# Patient Record
Sex: MALE | Race: WHITE | NOT HISPANIC OR LATINO | URBAN - METROPOLITAN AREA
[De-identification: names, ages, dates, MRNs, and addresses within clinical notes are randomized per-mention and may not be internally consistent; named-entity substitution may affect disease eponyms.]

---

## 2018-02-20 ENCOUNTER — EMERGENCY (EMERGENCY)
Facility: HOSPITAL | Age: 78
LOS: 1 days | Discharge: HOME | End: 2018-02-20
Attending: EMERGENCY MEDICINE
Payer: MEDICARE

## 2018-02-20 VITALS
HEIGHT: 65 IN | WEIGHT: 160.06 LBS | TEMPERATURE: 98 F | SYSTOLIC BLOOD PRESSURE: 199 MMHG | DIASTOLIC BLOOD PRESSURE: 75 MMHG | OXYGEN SATURATION: 97 % | HEART RATE: 57 BPM | RESPIRATION RATE: 18 BRPM

## 2018-02-20 VITALS
DIASTOLIC BLOOD PRESSURE: 71 MMHG | SYSTOLIC BLOOD PRESSURE: 154 MMHG | OXYGEN SATURATION: 100 % | HEART RATE: 55 BPM | RESPIRATION RATE: 20 BRPM

## 2018-02-20 DIAGNOSIS — Z98.890 OTHER SPECIFIED POSTPROCEDURAL STATES: Chronic | ICD-10-CM

## 2018-02-20 LAB
ALBUMIN SERPL ELPH-MCNC: 3.6 G/DL — SIGNIFICANT CHANGE UP (ref 3.5–5)
ALP SERPL-CCNC: 86 U/L — SIGNIFICANT CHANGE UP (ref 40–120)
ALT FLD-CCNC: 22 U/L DA — SIGNIFICANT CHANGE UP (ref 10–60)
ANION GAP SERPL CALC-SCNC: 7 MMOL/L — SIGNIFICANT CHANGE UP (ref 5–17)
AST SERPL-CCNC: 19 U/L — SIGNIFICANT CHANGE UP (ref 10–40)
BASOPHILS # BLD AUTO: 0 K/UL — SIGNIFICANT CHANGE UP (ref 0–0.2)
BASOPHILS NFR BLD AUTO: 0.6 % — SIGNIFICANT CHANGE UP (ref 0–2)
BILIRUB SERPL-MCNC: 0.9 MG/DL — SIGNIFICANT CHANGE UP (ref 0.2–1.2)
BUN SERPL-MCNC: 16 MG/DL — SIGNIFICANT CHANGE UP (ref 7–18)
CALCIUM SERPL-MCNC: 9.2 MG/DL — SIGNIFICANT CHANGE UP (ref 8.4–10.5)
CHLORIDE SERPL-SCNC: 105 MMOL/L — SIGNIFICANT CHANGE UP (ref 96–108)
CO2 SERPL-SCNC: 27 MMOL/L — SIGNIFICANT CHANGE UP (ref 22–31)
CREAT SERPL-MCNC: 0.87 MG/DL — SIGNIFICANT CHANGE UP (ref 0.5–1.3)
EOSINOPHIL # BLD AUTO: 0.1 K/UL — SIGNIFICANT CHANGE UP (ref 0–0.5)
EOSINOPHIL NFR BLD AUTO: 2.5 % — SIGNIFICANT CHANGE UP (ref 0–6)
GLUCOSE SERPL-MCNC: 121 MG/DL — HIGH (ref 70–99)
HCT VFR BLD CALC: 45.7 % — SIGNIFICANT CHANGE UP (ref 39–50)
HGB BLD-MCNC: 14.9 G/DL — SIGNIFICANT CHANGE UP (ref 13–17)
LYMPHOCYTES # BLD AUTO: 0.7 K/UL — LOW (ref 1–3.3)
LYMPHOCYTES # BLD AUTO: 12.2 % — LOW (ref 13–44)
MCHC RBC-ENTMCNC: 30.7 PG — SIGNIFICANT CHANGE UP (ref 27–34)
MCHC RBC-ENTMCNC: 32.6 GM/DL — SIGNIFICANT CHANGE UP (ref 32–36)
MCV RBC AUTO: 94.2 FL — SIGNIFICANT CHANGE UP (ref 80–100)
MONOCYTES # BLD AUTO: 0.5 K/UL — SIGNIFICANT CHANGE UP (ref 0–0.9)
MONOCYTES NFR BLD AUTO: 8.1 % — SIGNIFICANT CHANGE UP (ref 2–14)
NEUTROPHILS # BLD AUTO: 4.6 K/UL — SIGNIFICANT CHANGE UP (ref 1.8–7.4)
NEUTROPHILS NFR BLD AUTO: 76.7 % — SIGNIFICANT CHANGE UP (ref 43–77)
PLATELET # BLD AUTO: 132 K/UL — LOW (ref 150–400)
POTASSIUM SERPL-MCNC: 3.9 MMOL/L — SIGNIFICANT CHANGE UP (ref 3.5–5.3)
POTASSIUM SERPL-SCNC: 3.9 MMOL/L — SIGNIFICANT CHANGE UP (ref 3.5–5.3)
PROT SERPL-MCNC: 7 G/DL — SIGNIFICANT CHANGE UP (ref 6–8.3)
RBC # BLD: 4.86 M/UL — SIGNIFICANT CHANGE UP (ref 4.2–5.8)
RBC # FLD: 12.2 % — SIGNIFICANT CHANGE UP (ref 10.3–14.5)
SODIUM SERPL-SCNC: 139 MMOL/L — SIGNIFICANT CHANGE UP (ref 135–145)
TROPONIN I SERPL-MCNC: <0.015 NG/ML — SIGNIFICANT CHANGE UP (ref 0–0.04)
WBC # BLD: 5.9 K/UL — SIGNIFICANT CHANGE UP (ref 3.8–10.5)
WBC # FLD AUTO: 5.9 K/UL — SIGNIFICANT CHANGE UP (ref 3.8–10.5)

## 2018-02-20 PROCEDURE — 99284 EMERGENCY DEPT VISIT MOD MDM: CPT | Mod: 25

## 2018-02-20 PROCEDURE — 99284 EMERGENCY DEPT VISIT MOD MDM: CPT

## 2018-02-20 PROCEDURE — 85027 COMPLETE CBC AUTOMATED: CPT

## 2018-02-20 PROCEDURE — 80053 COMPREHEN METABOLIC PANEL: CPT

## 2018-02-20 PROCEDURE — 93005 ELECTROCARDIOGRAM TRACING: CPT

## 2018-02-20 PROCEDURE — 84484 ASSAY OF TROPONIN QUANT: CPT

## 2018-02-20 RX ORDER — METOPROLOL TARTRATE 50 MG
25 TABLET ORAL ONCE
Qty: 0 | Refills: 0 | Status: COMPLETED | OUTPATIENT
Start: 2018-02-20 | End: 2018-02-20

## 2018-02-20 RX ORDER — ISOSORBIDE MONONITRATE 60 MG/1
30 TABLET, EXTENDED RELEASE ORAL ONCE
Qty: 0 | Refills: 0 | Status: DISCONTINUED | OUTPATIENT
Start: 2018-02-20 | End: 2018-02-20

## 2018-02-20 NOTE — ED PROVIDER NOTE - MEDICAL DECISION MAKING DETAILS
Dizziness has resolved and at presentation had no signs of central ischemia, no other neurologic symptoms. CP has been chronic, followed by cardiologist, no prior ECG to compare with no RAUDEL noted today and nml trop, and no longer present. Patient well appearing, hemodynamically stable, BP improved spontaneously, given his missed morning metoprolol. Dizziness has resolved and at presentation had no signs of central ischemia, no other neurologic symptoms. CP has been chronic, followed by cardiologist, no prior ECG to compare with no RAUDEL noted today and nml trop, and no longer present. Patient well appearing, hemodynamically stable, BP improved spontaneously.

## 2018-02-20 NOTE — ED PROVIDER NOTE - PMH
CAD (coronary artery disease)    HLD (hyperlipidemia)    HTN (hypertension)    Stented coronary artery

## 2018-02-20 NOTE — ED PROVIDER NOTE - PHYSICAL EXAMINATION
Afebrile, hemodynamically stable  NAD, well appearing  Head NCAT  EOMI, anicteric, noted L eye ptosis chronic, mild fatiguing R eye nystagmus  MM dry  No JVD  RRR, nml S1/S2, no m/r/g  Lungs CTAB, no w/r/r  Abd soft, NT, ND, nml BS, no rebound or guarding  AAO, CN's 3-12 intact, motor 5/5 in all extrems  BLACK spontaneously, no leg cyanosis or edema  Skin warm, dry

## 2018-10-12 ENCOUNTER — APPOINTMENT (OUTPATIENT)
Dept: OTOLARYNGOLOGY | Facility: CLINIC | Age: 78
End: 2018-10-12
Payer: MEDICARE

## 2018-10-12 VITALS
DIASTOLIC BLOOD PRESSURE: 76 MMHG | HEART RATE: 68 BPM | SYSTOLIC BLOOD PRESSURE: 153 MMHG | TEMPERATURE: 98.5 F | OXYGEN SATURATION: 97 %

## 2018-10-12 PROCEDURE — 69210 REMOVE IMPACTED EAR WAX UNI: CPT

## 2018-10-12 PROCEDURE — 99203 OFFICE O/P NEW LOW 30 MIN: CPT | Mod: 25

## 2019-04-22 ENCOUNTER — APPOINTMENT (OUTPATIENT)
Dept: OTOLARYNGOLOGY | Facility: CLINIC | Age: 79
End: 2019-04-22
Payer: MEDICARE

## 2019-04-22 VITALS
HEART RATE: 61 BPM | DIASTOLIC BLOOD PRESSURE: 74 MMHG | TEMPERATURE: 97.4 F | SYSTOLIC BLOOD PRESSURE: 142 MMHG | OXYGEN SATURATION: 65 %

## 2019-04-22 DIAGNOSIS — H61.22 IMPACTED CERUMEN, LEFT EAR: ICD-10-CM

## 2019-04-22 DIAGNOSIS — H60.92 UNSPECIFIED OTITIS EXTERNA, LEFT EAR: ICD-10-CM

## 2019-04-22 PROCEDURE — 99213 OFFICE O/P EST LOW 20 MIN: CPT | Mod: 25

## 2019-04-22 PROCEDURE — 69210 REMOVE IMPACTED EAR WAX UNI: CPT | Mod: LT

## 2019-04-22 RX ORDER — NEOMYCIN SULFATE, POLYMYXIN B SULFATE AND HYDROCORTISONE 3.5; 10000; 1 MG/ML; [IU]/ML; MG/ML
3.5-10000-1 SOLUTION AURICULAR (OTIC) 4 TIMES DAILY
Qty: 1 | Refills: 4 | Status: ACTIVE | COMMUNITY
Start: 2019-04-22 | End: 1900-01-01

## 2019-04-22 NOTE — PROCEDURE
[] : Removal of Cerumen [FreeTextEntry5] : Bilateral cerumen impaction.  Cerumen impaction was removed via otoscope operating head and craven suction # 5

## 2019-04-22 NOTE — PHYSICAL EXAM
[de-identified] :  Alfonso AU removed [Midline] : trachea located in midline position [Normal] : no rashes

## 2019-04-22 NOTE — REASON FOR VISIT
[Subsequent Evaluation] : a subsequent evaluation for [FreeTextEntry2] : status post functional endoscopic sinus surgery & cerumen impaction.  Patient states the severity of the problem is a level  4 out of 10 and it occurs constant .  Patient states nothing improves or worsens his cerumen impaction

## 2019-04-22 NOTE — HISTORY OF PRESENT ILLNESS
[de-identified] : 74 years old status post functional endoscopic sinus surgery  male patient with history of cerumen impaction.  Patient is present today in the office with left ear  cerumen impaction.  Left ear Otitis externa

## 2019-04-22 NOTE — REVIEW OF SYSTEMS
[Patient Intake Form Reviewed] : Patient intake form was reviewed [As Noted in HPI] : as noted in HPI [Ear Pain] : ear pain [Hearing Loss] : hearing loss [Negative] : Heme/Lymph

## 2024-11-19 NOTE — ED ADULT TRIAGE NOTE - MEANS OF ARRIVAL
2nd attempt-Left message for patient to call back at [phone] to schedule below procedure.     Mailed letter for patient to contact office to schedule procedure.   
I called patient and left message to call back to schedule colonoscopy.  First attempt.    Referring Physician: Dr Crum    Procedure: Colonoscopy [id number]    Diagnosis:   COLONOSCOPY:  Screening for Colon Cancer  EGD:  N/A    DUE: mm/dd/year:  12.18.23 (N/A if never scoped)    BMI over 50: No  There is no height or weight on file to calculate BMI.     Recent (within 6 months) Myocardial Infarction or Stroke: No    Cardiac assistive device (VAD's not including pacemakers or automatic defibrillators):  No    (If yes to any of the above 3 questions, location must be hospital)    Location: [facility]    GI Physician: Any GI Physician  (Select if already established with GI, otherwise indicate Any GI Physician)    Diabetic: NO     Blood Thinners: Yes meloxicam    PHENTERMINE: NO      Pharmacy: (obtained at time of scheduling)          
stretcher

## 2025-01-09 ENCOUNTER — INPATIENT (INPATIENT)
Facility: HOSPITAL | Age: 85
LOS: 4 days | Discharge: HOME | DRG: 125 | End: 2025-01-14
Attending: STUDENT IN AN ORGANIZED HEALTH CARE EDUCATION/TRAINING PROGRAM | Admitting: STUDENT IN AN ORGANIZED HEALTH CARE EDUCATION/TRAINING PROGRAM
Payer: MEDICARE

## 2025-01-09 VITALS
WEIGHT: 149.91 LBS | HEART RATE: 64 BPM | RESPIRATION RATE: 18 BRPM | TEMPERATURE: 98 F | DIASTOLIC BLOOD PRESSURE: 73 MMHG | HEIGHT: 66 IN | OXYGEN SATURATION: 100 % | SYSTOLIC BLOOD PRESSURE: 149 MMHG

## 2025-01-09 DIAGNOSIS — Z98.890 OTHER SPECIFIED POSTPROCEDURAL STATES: Chronic | ICD-10-CM

## 2025-01-09 DIAGNOSIS — H54.7 UNSPECIFIED VISUAL LOSS: ICD-10-CM

## 2025-01-09 LAB
ALBUMIN SERPL ELPH-MCNC: 3.7 G/DL — SIGNIFICANT CHANGE UP (ref 3.5–5)
ALP SERPL-CCNC: 83 U/L — SIGNIFICANT CHANGE UP (ref 40–120)
ALT FLD-CCNC: 20 U/L DA — SIGNIFICANT CHANGE UP (ref 10–60)
ANION GAP SERPL CALC-SCNC: 5 MMOL/L — SIGNIFICANT CHANGE UP (ref 5–17)
APTT BLD: 29.8 SEC — SIGNIFICANT CHANGE UP (ref 24.5–35.6)
AST SERPL-CCNC: 19 U/L — SIGNIFICANT CHANGE UP (ref 10–40)
BASE EXCESS BLDV CALC-SCNC: 4.9 MMOL/L — SIGNIFICANT CHANGE UP
BASOPHILS # BLD AUTO: 0.02 K/UL — SIGNIFICANT CHANGE UP (ref 0–0.2)
BASOPHILS NFR BLD AUTO: 0.3 % — SIGNIFICANT CHANGE UP (ref 0–2)
BILIRUB SERPL-MCNC: 0.7 MG/DL — SIGNIFICANT CHANGE UP (ref 0.2–1.2)
BUN SERPL-MCNC: 19 MG/DL — HIGH (ref 7–18)
CALCIUM SERPL-MCNC: 9.6 MG/DL — SIGNIFICANT CHANGE UP (ref 8.4–10.5)
CHLORIDE SERPL-SCNC: 108 MMOL/L — SIGNIFICANT CHANGE UP (ref 96–108)
CO2 SERPL-SCNC: 25 MMOL/L — SIGNIFICANT CHANGE UP (ref 22–31)
CREAT SERPL-MCNC: 0.87 MG/DL — SIGNIFICANT CHANGE UP (ref 0.5–1.3)
EGFR: 84 ML/MIN/1.73M2 — SIGNIFICANT CHANGE UP
EOSINOPHIL # BLD AUTO: 0.15 K/UL — SIGNIFICANT CHANGE UP (ref 0–0.5)
EOSINOPHIL NFR BLD AUTO: 2.6 % — SIGNIFICANT CHANGE UP (ref 0–6)
ETHANOL SERPL-MCNC: <3 MG/DL — SIGNIFICANT CHANGE UP (ref 0–10)
GLUCOSE SERPL-MCNC: 127 MG/DL — HIGH (ref 70–99)
HCO3 BLDV-SCNC: 29 MMOL/L — SIGNIFICANT CHANGE UP (ref 22–29)
HCT VFR BLD CALC: 39.4 % — SIGNIFICANT CHANGE UP (ref 39–50)
HGB BLD-MCNC: 13.9 G/DL — SIGNIFICANT CHANGE UP (ref 13–17)
IMM GRANULOCYTES NFR BLD AUTO: 0.2 % — SIGNIFICANT CHANGE UP (ref 0–0.9)
INR BLD: 0.98 RATIO — SIGNIFICANT CHANGE UP (ref 0.85–1.16)
LYMPHOCYTES # BLD AUTO: 1.2 K/UL — SIGNIFICANT CHANGE UP (ref 1–3.3)
LYMPHOCYTES # BLD AUTO: 20.9 % — SIGNIFICANT CHANGE UP (ref 13–44)
MCHC RBC-ENTMCNC: 32.4 PG — SIGNIFICANT CHANGE UP (ref 27–34)
MCHC RBC-ENTMCNC: 35.3 G/DL — SIGNIFICANT CHANGE UP (ref 32–36)
MCV RBC AUTO: 91.8 FL — SIGNIFICANT CHANGE UP (ref 80–100)
MONOCYTES # BLD AUTO: 0.72 K/UL — SIGNIFICANT CHANGE UP (ref 0–0.9)
MONOCYTES NFR BLD AUTO: 12.6 % — SIGNIFICANT CHANGE UP (ref 2–14)
NEUTROPHILS # BLD AUTO: 3.63 K/UL — SIGNIFICANT CHANGE UP (ref 1.8–7.4)
NEUTROPHILS NFR BLD AUTO: 63.4 % — SIGNIFICANT CHANGE UP (ref 43–77)
NRBC # BLD: 0 /100 WBCS — SIGNIFICANT CHANGE UP (ref 0–0)
PCO2 BLDV: 41 MMHG — LOW (ref 42–55)
PH BLDV: 7.46 — HIGH (ref 7.32–7.43)
PLATELET # BLD AUTO: 160 K/UL — SIGNIFICANT CHANGE UP (ref 150–400)
PO2 BLDV: 41 MMHG — SIGNIFICANT CHANGE UP
POTASSIUM SERPL-MCNC: 4.2 MMOL/L — SIGNIFICANT CHANGE UP (ref 3.5–5.3)
POTASSIUM SERPL-SCNC: 4.2 MMOL/L — SIGNIFICANT CHANGE UP (ref 3.5–5.3)
PROT SERPL-MCNC: 6.8 G/DL — SIGNIFICANT CHANGE UP (ref 6–8.3)
PROTHROM AB SERPL-ACNC: 11.4 SEC — SIGNIFICANT CHANGE UP (ref 9.9–13.4)
RBC # BLD: 4.29 M/UL — SIGNIFICANT CHANGE UP (ref 4.2–5.8)
RBC # FLD: 12.6 % — SIGNIFICANT CHANGE UP (ref 10.3–14.5)
SAO2 % BLDV: 58.1 % — SIGNIFICANT CHANGE UP
SODIUM SERPL-SCNC: 138 MMOL/L — SIGNIFICANT CHANGE UP (ref 135–145)
TROPONIN I, HIGH SENSITIVITY RESULT: 17.8 NG/L — SIGNIFICANT CHANGE UP
WBC # BLD: 5.73 K/UL — SIGNIFICANT CHANGE UP (ref 3.8–10.5)
WBC # FLD AUTO: 5.73 K/UL — SIGNIFICANT CHANGE UP (ref 3.8–10.5)

## 2025-01-09 PROCEDURE — 70496 CT ANGIOGRAPHY HEAD: CPT | Mod: 26

## 2025-01-09 PROCEDURE — 70498 CT ANGIOGRAPHY NECK: CPT | Mod: 26

## 2025-01-09 PROCEDURE — 70450 CT HEAD/BRAIN W/O DYE: CPT | Mod: 26,XU

## 2025-01-09 PROCEDURE — 99285 EMERGENCY DEPT VISIT HI MDM: CPT

## 2025-01-09 PROCEDURE — 93010 ELECTROCARDIOGRAM REPORT: CPT

## 2025-01-09 RX ORDER — LATANOPROST 50 UG/ML
1 SOLUTION OPHTHALMIC
Refills: 0 | DISCHARGE

## 2025-01-09 RX ORDER — METOPROLOL TARTRATE 50 MG
1 TABLET ORAL
Refills: 0 | DISCHARGE

## 2025-01-09 RX ORDER — GINKGO BILOBA 40 MG
3 CAPSULE ORAL AT BEDTIME
Refills: 0 | Status: DISCONTINUED | OUTPATIENT
Start: 2025-01-09 | End: 2025-01-14

## 2025-01-09 RX ORDER — ASPIRIN 81 MG
1 TABLET, DELAYED RELEASE (ENTERIC COATED) ORAL
Refills: 0 | DISCHARGE

## 2025-01-09 RX ORDER — RAMIPRIL 5 MG/1
1 CAPSULE ORAL
Refills: 0 | DISCHARGE

## 2025-01-09 RX ORDER — TIMOLOL MALEATE 0.5 %
1 DROPS OPHTHALMIC (EYE)
Refills: 0 | DISCHARGE

## 2025-01-09 RX ORDER — ISOSORBIDE DINITRATE 10 MG
1 TABLET ORAL
Refills: 0 | DISCHARGE

## 2025-01-09 RX ORDER — ACETAMINOPHEN 80 MG/.8ML
650 SOLUTION/ DROPS ORAL EVERY 6 HOURS
Refills: 0 | Status: DISCONTINUED | OUTPATIENT
Start: 2025-01-09 | End: 2025-01-14

## 2025-01-09 RX ORDER — ROSUVASTATIN 40 MG/1
1 TABLET, FILM COATED ORAL
Refills: 0 | DISCHARGE

## 2025-01-09 NOTE — ED ADULT TRIAGE NOTE - CHIEF COMPLAINT QUOTE
Right eye sudden vision loss x 3 days ago that lasted 1 hr. Sent by eye doctor today to r/o TIA. No neuro deficit noted.

## 2025-01-09 NOTE — ED PROVIDER NOTE - CLINICAL SUMMARY MEDICAL DECISION MAKING FREE TEXT BOX
Presentation concerning for possible TIA.  Although the episode was 2 days ago patient only has 1 functional eye and is at the extreme of age.  Patient to be admitted for full TIA workup.

## 2025-01-09 NOTE — ED PROVIDER NOTE - OBJECTIVE STATEMENT
84-year-old male with left eye blindness presents reporting episode of right eye vision loss that occurred 3 days ago.  Patient reports upon waking having a period of inability to see upon waking up.  Reports the following day he saw his primary care doctor who then scheduled him to see ophthalmologist.  Reports yesterday being seen by ophthalmology and being told today to come to the ED out of concern for possible CVA versus TIA.  Patient reports his vision has returned and has no complaints at the bedside.

## 2025-01-09 NOTE — ED ADULT NURSE NOTE - OBJECTIVE STATEMENT
Patient came in ED c/o right eye vision loss for 3 days, patient was seen by eye doctor and was referred to ED to R/o TIA. Patient AoX3, steady, no neuro deficit noted.

## 2025-01-10 ENCOUNTER — RESULT REVIEW (OUTPATIENT)
Age: 85
End: 2025-01-10

## 2025-01-10 DIAGNOSIS — I10 ESSENTIAL (PRIMARY) HYPERTENSION: ICD-10-CM

## 2025-01-10 DIAGNOSIS — H54.7 UNSPECIFIED VISUAL LOSS: ICD-10-CM

## 2025-01-10 DIAGNOSIS — I63.9 CEREBRAL INFARCTION, UNSPECIFIED: ICD-10-CM

## 2025-01-10 DIAGNOSIS — I25.10 ATHEROSCLEROTIC HEART DISEASE OF NATIVE CORONARY ARTERY WITHOUT ANGINA PECTORIS: ICD-10-CM

## 2025-01-10 DIAGNOSIS — E78.5 HYPERLIPIDEMIA, UNSPECIFIED: ICD-10-CM

## 2025-01-10 DIAGNOSIS — Z29.9 ENCOUNTER FOR PROPHYLACTIC MEASURES, UNSPECIFIED: ICD-10-CM

## 2025-01-10 LAB
A1C WITH ESTIMATED AVERAGE GLUCOSE RESULT: 6 % — HIGH (ref 4–5.6)
ALBUMIN SERPL ELPH-MCNC: 3.8 G/DL — SIGNIFICANT CHANGE UP (ref 3.5–5)
ALP SERPL-CCNC: 93 U/L — SIGNIFICANT CHANGE UP (ref 40–120)
ALT FLD-CCNC: 21 U/L DA — SIGNIFICANT CHANGE UP (ref 10–60)
ANION GAP SERPL CALC-SCNC: 7 MMOL/L — SIGNIFICANT CHANGE UP (ref 5–17)
AST SERPL-CCNC: 20 U/L — SIGNIFICANT CHANGE UP (ref 10–40)
BILIRUB SERPL-MCNC: 1 MG/DL — SIGNIFICANT CHANGE UP (ref 0.2–1.2)
BUN SERPL-MCNC: 19 MG/DL — HIGH (ref 7–18)
CALCIUM SERPL-MCNC: 9.7 MG/DL — SIGNIFICANT CHANGE UP (ref 8.4–10.5)
CHLORIDE SERPL-SCNC: 110 MMOL/L — HIGH (ref 96–108)
CHOLEST SERPL-MCNC: 124 MG/DL — SIGNIFICANT CHANGE UP
CO2 SERPL-SCNC: 25 MMOL/L — SIGNIFICANT CHANGE UP (ref 22–31)
CREAT SERPL-MCNC: 0.93 MG/DL — SIGNIFICANT CHANGE UP (ref 0.5–1.3)
EGFR: 80 ML/MIN/1.73M2 — SIGNIFICANT CHANGE UP
ESTIMATED AVERAGE GLUCOSE: 126 MG/DL — HIGH (ref 68–114)
GLUCOSE SERPL-MCNC: 119 MG/DL — HIGH (ref 70–99)
HCT VFR BLD CALC: 41.1 % — SIGNIFICANT CHANGE UP (ref 39–50)
HDLC SERPL-MCNC: 44 MG/DL — SIGNIFICANT CHANGE UP
HGB BLD-MCNC: 14.3 G/DL — SIGNIFICANT CHANGE UP (ref 13–17)
LIPID PNL WITH DIRECT LDL SERPL: 60 MG/DL — SIGNIFICANT CHANGE UP
MAGNESIUM SERPL-MCNC: 2.4 MG/DL — SIGNIFICANT CHANGE UP (ref 1.6–2.6)
MCHC RBC-ENTMCNC: 32 PG — SIGNIFICANT CHANGE UP (ref 27–34)
MCHC RBC-ENTMCNC: 34.8 G/DL — SIGNIFICANT CHANGE UP (ref 32–36)
MCV RBC AUTO: 91.9 FL — SIGNIFICANT CHANGE UP (ref 80–100)
NON HDL CHOLESTEROL: 80 MG/DL — SIGNIFICANT CHANGE UP
NRBC # BLD: 0 /100 WBCS — SIGNIFICANT CHANGE UP (ref 0–0)
PHOSPHATE SERPL-MCNC: 3.5 MG/DL — SIGNIFICANT CHANGE UP (ref 2.5–4.5)
PLATELET # BLD AUTO: 164 K/UL — SIGNIFICANT CHANGE UP (ref 150–400)
POTASSIUM SERPL-MCNC: 4.1 MMOL/L — SIGNIFICANT CHANGE UP (ref 3.5–5.3)
POTASSIUM SERPL-SCNC: 4.1 MMOL/L — SIGNIFICANT CHANGE UP (ref 3.5–5.3)
PROT SERPL-MCNC: 7 G/DL — SIGNIFICANT CHANGE UP (ref 6–8.3)
RBC # BLD: 4.47 M/UL — SIGNIFICANT CHANGE UP (ref 4.2–5.8)
RBC # FLD: 12.6 % — SIGNIFICANT CHANGE UP (ref 10.3–14.5)
SODIUM SERPL-SCNC: 142 MMOL/L — SIGNIFICANT CHANGE UP (ref 135–145)
TRIGL SERPL-MCNC: 109 MG/DL — SIGNIFICANT CHANGE UP
WBC # BLD: 5.62 K/UL — SIGNIFICANT CHANGE UP (ref 3.8–10.5)
WBC # FLD AUTO: 5.62 K/UL — SIGNIFICANT CHANGE UP (ref 3.8–10.5)

## 2025-01-10 PROCEDURE — 99222 1ST HOSP IP/OBS MODERATE 55: CPT

## 2025-01-10 PROCEDURE — 99223 1ST HOSP IP/OBS HIGH 75: CPT

## 2025-01-10 PROCEDURE — 93306 TTE W/DOPPLER COMPLETE: CPT | Mod: 26

## 2025-01-10 RX ORDER — METOPROLOL TARTRATE 50 MG
25 TABLET ORAL DAILY
Refills: 0 | Status: DISCONTINUED | OUTPATIENT
Start: 2025-01-10 | End: 2025-01-14

## 2025-01-10 RX ORDER — TIMOLOL MALEATE 0.5 %
1 DROPS OPHTHALMIC (EYE) DAILY
Refills: 0 | Status: DISCONTINUED | OUTPATIENT
Start: 2025-01-10 | End: 2025-01-14

## 2025-01-10 RX ORDER — LATANOPROST 50 UG/ML
1 SOLUTION OPHTHALMIC AT BEDTIME
Refills: 0 | Status: DISCONTINUED | OUTPATIENT
Start: 2025-01-10 | End: 2025-01-14

## 2025-01-10 RX ORDER — ASPIRIN 81 MG
81 TABLET, DELAYED RELEASE (ENTERIC COATED) ORAL DAILY
Refills: 0 | Status: DISCONTINUED | OUTPATIENT
Start: 2025-01-10 | End: 2025-01-14

## 2025-01-10 RX ORDER — ENOXAPARIN SODIUM 60 MG/.6ML
40 INJECTION INTRAVENOUS; SUBCUTANEOUS EVERY 24 HOURS
Refills: 0 | Status: DISCONTINUED | OUTPATIENT
Start: 2025-01-10 | End: 2025-01-14

## 2025-01-10 RX ORDER — ISOSORBIDE DINITRATE 10 MG
20 TABLET ORAL THREE TIMES A DAY
Refills: 0 | Status: DISCONTINUED | OUTPATIENT
Start: 2025-01-10 | End: 2025-01-14

## 2025-01-10 RX ORDER — ATORVASTATIN CALCIUM 40 MG/1
80 TABLET, FILM COATED ORAL AT BEDTIME
Refills: 0 | Status: DISCONTINUED | OUTPATIENT
Start: 2025-01-10 | End: 2025-01-14

## 2025-01-10 RX ORDER — CLOPIDOGREL BISULFATE 75 MG/1
75 TABLET, FILM COATED ORAL DAILY
Refills: 0 | Status: DISCONTINUED | OUTPATIENT
Start: 2025-01-10 | End: 2025-01-14

## 2025-01-10 RX ORDER — LISINOPRIL 30 MG/1
40 TABLET ORAL DAILY
Refills: 0 | Status: DISCONTINUED | OUTPATIENT
Start: 2025-01-10 | End: 2025-01-14

## 2025-01-10 RX ORDER — ALPRAZOLAM 0.25 MG/1
0.25 TABLET ORAL
Refills: 0 | Status: DISCONTINUED | OUTPATIENT
Start: 2025-01-10 | End: 2025-01-12

## 2025-01-10 RX ADMIN — Medication 3 MILLIGRAM(S): at 22:17

## 2025-01-10 RX ADMIN — LATANOPROST 1 DROP(S): 50 SOLUTION OPHTHALMIC at 22:24

## 2025-01-10 RX ADMIN — ENOXAPARIN SODIUM 40 MILLIGRAM(S): 60 INJECTION INTRAVENOUS; SUBCUTANEOUS at 07:48

## 2025-01-10 RX ADMIN — Medication 25 MILLIGRAM(S): at 07:48

## 2025-01-10 RX ADMIN — ALPRAZOLAM 0.25 MILLIGRAM(S): 0.25 TABLET ORAL at 22:17

## 2025-01-10 RX ADMIN — Medication 81 MILLIGRAM(S): at 12:23

## 2025-01-10 RX ADMIN — ATORVASTATIN CALCIUM 80 MILLIGRAM(S): 40 TABLET, FILM COATED ORAL at 22:17

## 2025-01-10 RX ADMIN — CLOPIDOGREL BISULFATE 75 MILLIGRAM(S): 75 TABLET, FILM COATED ORAL at 12:49

## 2025-01-10 RX ADMIN — Medication 1 DROP(S): at 17:22

## 2025-01-10 RX ADMIN — Medication 20 MILLIGRAM(S): at 22:18

## 2025-01-10 RX ADMIN — Medication 1 DROP(S): at 07:48

## 2025-01-10 RX ADMIN — LISINOPRIL 40 MILLIGRAM(S): 30 TABLET ORAL at 07:48

## 2025-01-10 RX ADMIN — Medication 1 DROP(S): at 22:18

## 2025-01-10 RX ADMIN — Medication 20 MILLIGRAM(S): at 12:23

## 2025-01-10 RX ADMIN — Medication 10 MILLIGRAM(S): at 07:48

## 2025-01-10 RX ADMIN — Medication 20 MILLIGRAM(S): at 07:48

## 2025-01-10 NOTE — PATIENT PROFILE ADULT - FALL HARM RISK - HARM RISK INTERVENTIONS

## 2025-01-10 NOTE — CONSULT NOTE ADULT - SUBJECTIVE AND OBJECTIVE BOX
NEUROLOGY CONSULT NOTE    NAME:  ERMELINDA ALY      ASSESSMENT:        RECOMMENDATIONS:        NOTE TO BE COMPLETED - PLEASE REFER TO ABOVE ONLY AND IGNORE INFORMATION BELOW    *******************************      CHIEF COMPLAINT:  Patient is a 84y old  Male who presents with a chief complaint of CVA (10 Sylvester 2025 10:03)      HPI:  84-year-old male, AAOx3, ambulates with a  walker with left eye blindness, HTN, cad s/p CABG , hld, presents reporting episode of right eye vision loss that occurred 3 days ago.  Patient reports upon waking having a period of inability to see upon waking up in his right eye for about 15 minutes on and off.  Reports the following day he saw his primary care doctor who then scheduled him to see ophthalmologist.  Reports yesterday being seen by ophthalmology and being told today to come to the ED out of concern for possible CVA versus TIA.   His vision now has normalized. He reports this has never happened to him before. Denies any fever, chills, n/v, cp, sob, abdominal pain, n/v, or diarrhea.     ED course:  Vitals: temp 98, HR 64, /73  cth/angio/neck: neg  (10 Sylvester 2025 00:43)      NEURO HPI:      PAST MEDICAL & SURGICAL HISTORY:  HTN (hypertension)      CAD (coronary artery disease)      HLD (hyperlipidemia)      Stented coronary artery      History of cholecystectomy          MEDICATIONS:  acetaminophen     Tablet .. 650 milliGRAM(s) Oral every 6 hours PRN  ALPRAZolam 0.25 milliGRAM(s) Oral two times a day PRN  amLODIPine   Tablet 10 milliGRAM(s) Oral daily  aspirin enteric coated 81 milliGRAM(s) Oral daily  atorvastatin 80 milliGRAM(s) Oral at bedtime  clopidogrel Tablet 75 milliGRAM(s) Oral daily  dorzolamide 2% Ophthalmic Solution 1 Drop(s) Both EYES three times a day  enoxaparin Injectable 40 milliGRAM(s) SubCutaneous every 24 hours  isosorbide   dinitrate Tablet (ISORDIL) 20 milliGRAM(s) Oral three times a day  latanoprost 0.005% Ophthalmic Solution 1 Drop(s) Both EYES at bedtime  lisinopril 40 milliGRAM(s) Oral daily  melatonin 3 milliGRAM(s) Oral at bedtime PRN  metoprolol tartrate 25 milliGRAM(s) Oral daily  timolol 0.5% Solution 1 Drop(s) Both EYES daily      ALLERGIES:  No Known Allergies      FAMILY HISTORY:        SOCIAL HISTORY:  Denies alcohol, tobacco, or illicit drug use      REVIEW OF SYSTEMS:  GENERAL: No fever, weight changes, fatigue  EYES: No eye pain or discharge  EAR/NOSE/MOUTH/THROAT: No sinus or throat pain; No difficulty hearing  NECK: No pain or stiffness  RESPIRATORY: No cough, wheezing, chills, or hemoptysis  CARDIOVASCULAR: No chest pain, palpitations, shortness of breath, or dyspnea on exertion  GASTROINTESTINAL: No abdominal pain, nausea, vomiting, hematemesis, diarrhea, or constipation  GENITOURINARY: No dysuria, frequency, hematuria, or incontinence  SKIN: No rashes or lesions  ENDOCRINE: No heat or cold intolerance  HEMATOLOGIC: No easy bruising or bleeding  PSYCHIATRIC: No depression, anxiety, or mood swings  MUSCULOSKELETAL: No joint pain or swelling  NEUROLOGICAL: As per HPI          OBJECTIVE:    Vital Signs Last 24 Hrs  T(C): 37.2 (10 Sylvester 2025 20:40), Max: 37.2 (10 Sylvester 2025 20:40)  T(F): 99 (10 Sylvester 2025 20:40), Max: 99 (10 Sylvester 2025 20:40)  HR: 56 (10 Sylvester 2025 20:40) (51 - 60)  BP: 112/68 (10 Sylvester 2025 20:40) (111/44 - 161/64)  BP(mean): --  RR: 16 (10 Sylvester 2025 20:40) (16 - 18)  SpO2: 98% (10 Sylvester 2025 20:40) (94% - 98%)    Parameters below as of 10 Sylvester 2025 20:40  Patient On (Oxygen Delivery Method): room air        General Examination:  General: No acute distress  HEENT: Atraumatic, Normocephalic  Respiratory: CTA B/l.  No crackles, rhonchi, or wheezes.  Cardiovascular: RRR.  Normal S1 & S2.  Normal b/l radial and pedal pulses.    Neurological Examination:  General / Mental Status: AAO x 3.  No aphasia or dysarthria.  Naming and repetition intact.  Cranial Nerves: VFF x 4 .  PERRL.  EOMI x 2, No nystagmus or diplopia.  B/l V1-V3 equal and intact to light touch and pinprick.  Symmetric facial movement and palate elevation.  B/l hearing equal to finger rub.  5/5 strength with b/l sternocleidomastoid and trapezius.  Midline tongue protrusion, with no atrophy or fasciculations.  Motor: Normal bulk & tone in all four extremities.  5/5 strength throughout all four extremities.  No downward drift, rigidity, spasticity, or tremors in any of the four extremities.  Sensory: Intact to light touch and pinprick in all four extremities.  Negative Romberg.  Reflex: 2+ and symmetric at b/l biceps, triceps, brachioradialis, patellae, and ankles.  Downgoing toes b/l.  Coordination: No dysmetria with b/l finger-to-nose and heel raise tests.  Symmetric rapid alternating movements b/l.  Gait: Normal, narrow-based gait.  No difficulty with tiptoe, heel, and tandem gaits.          LABORATORY VALUES:                        14.3   5.62  )-----------( 164      ( 10 Sylvester 2025 06:29 )             41.1       01-10    142  |  110[H]  |  19[H]  ----------------------------<  119[H]  4.1   |  25  |  0.93    Ca    9.7      10 Sylvester 2025 06:29  Phos  3.5     01-10  Mg     2.4     01-10    TPro  7.0  /  Alb  3.8  /  TBili  1.0  /  DBili  x   /  AST  20  /  ALT  21  /  AlkPhos  93  01-10    01-10 Chol 124 LDL 60 HDL 44 Trig 109    A1C with Estimated Average Glucose in AM (01.10.25 @ 06:29)   A1C with Estimated Average Glucose Result: 6.0%  Estimated Average Glucose: 126 mg/dL          NEUROIMAGING:      CT Head & CTA Head/Neck (1/9/25):  - No acute intracranial structural abnormality  - No intracranial or neck vessel abnormality          Please contact the Neurology consult service with any neurological questions.    Pradeep King MD   of Neurology  [facility] [employer] [facility] at [facility] NEUROLOGY CONSULT NOTE    NAME:  ERMELINDA ALY      ASSESSMENT:  84M with chronic left eye blindness presenting with transient right eye blindness, concerning for transient ischemic attack with transient central retinal artery occlusion      RECOMMENDATIONS:      1. TIA/Stroke workup  - MRI Brain w/wo Gadolinium & MRI Orbits w/wo Gadolinium approved to evaluate for acute intracranial or orbital abnormalities (if there are no contraindications)  - Transthoracic Echocardiogram  - Telemetry monitoring while inpatient      2. Stroke prevention  - Q4H Neurochecks & Vital signs  - Patient has passed a bedside swallow evaluation  - Aspirin 81mg PO Daily (or Aspirin 300mg ID daily if NPO)  - Clopidogrel 75mg PO Daily x 21 days  - Okay to change Atorvastatin 80mg to home Rosuvastatin 5mg PO QHS (LDL < 70 mg/dL at this dose)  - Maintain home antihypertensive medications, No role for permissive hypertension at this time - Treat BP if over 140/90 (goal /80)  - Diabetes management as per primary team  - PT/OT as needed  - DVT ppx: SCDs, Enoxaparin          *******************************      CHIEF COMPLAINT:  Patient is a 84y old  Male who presents with a chief complaint of CVA (10 Sylvester 2025 10:03)      HPI:  84-year-old male, AAOx3, ambulates with a  walker with left eye blindness, HTN, cad s/p CABG , hld, presents reporting episode of right eye vision loss that occurred 3 days ago.  Patient reports upon waking having a period of inability to see upon waking up in his right eye for about 15 minutes on and off.  Reports the following day he saw his primary care doctor who then scheduled him to see ophthalmologist.  Reports yesterday being seen by ophthalmology and being told today to come to the ED out of concern for possible CVA versus TIA.   His vision now has normalized. He reports this has never happened to him before. Denies any fever, chills, n/v, cp, sob, abdominal pain, n/v, or diarrhea.   ED course:  Vitals: temp 98, HR 64, /73  cth/angio/neck: neg  (10 Sylvester 2025 00:43)      NEURO HPI:  84M with chronic left eye blindness presenting with transient right eye vision loss.      PAST MEDICAL & SURGICAL HISTORY:  HTN (hypertension)  CAD (coronary artery disease)  HLD (hyperlipidemia)  Stented coronary artery  History of cholecystectomy      MEDICATIONS:  acetaminophen     Tablet .. 650 milliGRAM(s) Oral every 6 hours PRN  ALPRAZolam 0.25 milliGRAM(s) Oral two times a day PRN  amLODIPine   Tablet 10 milliGRAM(s) Oral daily  aspirin enteric coated 81 milliGRAM(s) Oral daily  atorvastatin 80 milliGRAM(s) Oral at bedtime  clopidogrel Tablet 75 milliGRAM(s) Oral daily  dorzolamide 2% Ophthalmic Solution 1 Drop(s) Both EYES three times a day  enoxaparin Injectable 40 milliGRAM(s) SubCutaneous every 24 hours  isosorbide   dinitrate Tablet (ISORDIL) 20 milliGRAM(s) Oral three times a day  latanoprost 0.005% Ophthalmic Solution 1 Drop(s) Both EYES at bedtime  lisinopril 40 milliGRAM(s) Oral daily  melatonin 3 milliGRAM(s) Oral at bedtime PRN  metoprolol tartrate 25 milliGRAM(s) Oral daily  timolol 0.5% Solution 1 Drop(s) Both EYES daily      ALLERGIES:  No Known Allergies      FAMILY HISTORY:  No reported family history of stroke      SOCIAL HISTORY:  No reported alcohol, tobacco, or illicit drug use      REVIEW OF SYSTEMS:  GENERAL: No fever, weight changes, fatigue  EYES: Vision loss as per HPI  EAR/NOSE/MOUTH/THROAT: No sinus or throat pain; No difficulty hearing  NECK: No pain or stiffness  RESPIRATORY: No cough, wheezing, chills, or hemoptysis  CARDIOVASCULAR: No chest pain, palpitations, shortness of breath, or dyspnea on exertion  GASTROINTESTINAL: No abdominal pain, nausea, vomiting, hematemesis, diarrhea, or constipation  GENITOURINARY: No dysuria, frequency, hematuria, or incontinence  SKIN: No rashes or lesions  ENDOCRINE: No heat or cold intolerance  HEMATOLOGIC: No easy bruising or bleeding  PSYCHIATRIC: No depression, anxiety, or mood swings  MUSCULOSKELETAL: No joint pain or swelling  NEUROLOGICAL: As per HPI          OBJECTIVE:    Vital Signs Last 24 Hrs  T(C): 37.2 (10 Sylvester 2025 20:40), Max: 37.2 (10 Sylvester 2025 20:40)  T(F): 99 (10 Sylvester 2025 20:40), Max: 99 (10 Sylvester 2025 20:40)  HR: 56 (10 Sylvester 2025 20:40) (51 - 60)  BP: 112/68 (10 Sylvester 2025 20:40) (111/44 - 161/64)  RR: 16 (10 Sylvester 2025 20:40) (16 - 18)  SpO2: 98% (10 Sylvester 2025 20:40) (94% - 98%)  Parameters below as of 10 Sylvester 2025 20:40  Patient On (Oxygen Delivery Method): room air      General Examination:  General: No acute distress  HEENT: Atraumatic, Normocephalic  Respiratory: CTA B/l.  No crackles, rhonchi, or wheezes.  Cardiovascular: Low heart rate, Regular rhythm.  Normal b/l radial and pedal pulses.    Neurological Examination:  General / Mental Status: AAO x 3.  No aphasia or dysarthria.  Naming and repetition intact.  Cranial Nerves: VFF x 4 with right eye; left eye blindness present.  PERRL.  EOMI x 2.  B/l V1-V3 equal and intact to light touch and pinprick.  Symmetric facial movement and palate elevation.  B/l hearing equal to finger rub.  5/5 strength with b/l sternocleidomastoid and trapezius.  Midline tongue protrusion, with no atrophy or fasciculations.  Motor: Normal bulk & tone in all four extremities.  5/5 strength throughout all four extremities.  No downward drift, rigidity, spasticity, or tremors in any of the four extremities.  Sensory: Intact to light touch and pinprick in all four extremities.  Reflex: 1+ and symmetric at b/l biceps, triceps, brachioradialis, patellae, and ankles.  Downgoing toes b/l.  Coordination: No dysmetria with b/l finger-to-nose and heel raise tests.  Gait and Romberg sign testing deferred per patient preference.      NIHSS Score:    LOC - 0  LOC Questions - 0  LOC Commands - 0  Gaze Preference - 0  Visual Fields - 2  Facial Palsy - 0  Motor Arm Left - 0  Motor Arm Right - 0  Motor Leg Left - 0  Motor Leg Right - 0  Limb Ataxia - 0  Sensory - 0  Language - 0  Speech - 0  Extinction - 0    NIHSS Score Total: 2 - No IV TNK because of resolved acute symptoms    Modified [place] Scale: 1          LABORATORY VALUES:                        14.3   5.62  )-----------( 164      ( 10 Sylvester 2025 06:29 )             41.1       01-10    142  |  110[H]  |  19[H]  ----------------------------<  119[H]  4.1   |  25  |  0.93    Ca    9.7      10 Sylvester 2025 06:29  Phos  3.5     01-10  Mg     2.4     01-10    TPro  7.0  /  Alb  3.8  /  TBili  1.0  /  DBili  x   /  AST  20  /  ALT  21  /  AlkPhos  93  01-10    01-10 Chol 124 LDL 60 HDL 44 Trig 109    A1C with Estimated Average Glucose in AM (01.10.25 @ 06:29)   A1C with Estimated Average Glucose Result: 6.0%  Estimated Average Glucose: 126 mg/dL          NEUROIMAGING:      CT Head & CTA Head/Neck (1/9/25):  - No acute intracranial structural abnormality  - No intracranial or neck vessel abnormality          Please contact the Neurology consult service with any neurological questions.    Pradeep King MD   of Neurology  [facility] [employer] [facility] at [facility]

## 2025-01-10 NOTE — H&P ADULT - ATTENDING COMMENTS
Vital Signs Last 24 Hrs  T(C): 36.4 (09 Jan 2025 23:49), Max: 36.7 (09 Jan 2025 16:10)  T(F): 97.5 (09 Jan 2025 23:49), Max: 98 (09 Jan 2025 16:10)  HR: 65 (09 Jan 2025 23:49) (64 - 65)  BP: 148/71 (09 Jan 2025 23:49) (148/71 - 149/73)  BP(mean): 97 (09 Jan 2025 23:49) (97 - 97)  RR: 17 (09 Jan 2025 23:49) (17 - 18)  SpO2: 98% (09 Jan 2025 23:49) (98% - 100%)  Parameters below as of 09 Jan 2025 23:49  Patient On (Oxygen Delivery Method): room air    Labs reviewed  unremarkable     CVA work up   unremarkable imaging    Impression   84 year old man with medical hx including HTN, CAD s/p CABG with report of sudden onset painless right eye vision loss that lasted about 3 days. Concern for possible CVA after an uneventful opthalmology visit.   Clinical exam and work up here so far unremarkable   Pt admitted for further work up and Neurology consultation   Admit to tele  Serial NIHSS which has been 0  Dysphagia screen - unremarkable   ASA/ statin   Lipid and A1c   TTE  Neurology consult   Med reconciliation   Resume appropriate home meds

## 2025-01-10 NOTE — PROGRESS NOTE ADULT - SUBJECTIVE AND OBJECTIVE BOX
Patient is a 84y old  Male who presents with a chief complaint of CVA (10 Sylvester 2025 10:03)      OVERNIGHT EVENTS/INTERVAL HPI:  -no acute event ON, AVSS.  -vision improving compared to before, but still reported blurry in center of vison field, intact ECOM, pupil 5mm in size, reactive to light, intact vision field, denies eye pain, drainage, pus  -Pt denies chest pain, chest palpation, sob, difficulty in breathing, HA, fever, chill    MEDICATIONS  (STANDING):  amLODIPine   Tablet 10 milliGRAM(s) Oral daily  aspirin enteric coated 81 milliGRAM(s) Oral daily  atorvastatin 80 milliGRAM(s) Oral at bedtime  dorzolamide 2% Ophthalmic Solution 1 Drop(s) Both EYES three times a day  enoxaparin Injectable 40 milliGRAM(s) SubCutaneous every 24 hours  isosorbide   dinitrate Tablet (ISORDIL) 20 milliGRAM(s) Oral three times a day  latanoprost 0.005% Ophthalmic Solution 1 Drop(s) Both EYES at bedtime  lisinopril 40 milliGRAM(s) Oral daily  metoprolol tartrate 25 milliGRAM(s) Oral daily  timolol 0.5% Solution 1 Drop(s) Both EYES daily    MEDICATIONS  (PRN):  acetaminophen     Tablet .. 650 milliGRAM(s) Oral every 6 hours PRN Temp greater or equal to 38C (100.4F), Mild Pain (1 - 3)  melatonin 3 milliGRAM(s) Oral at bedtime PRN Insomnia      Allergies    No Known Allergies    Intolerances        REVIEW OF SYSTEMS: stated as above  OBJECTIVE:  Vital Signs Last 24 Hrs  T(C): 36.4 (10 Sylvester 2025 10:10), Max: 36.7 (09 Jan 2025 16:10)  T(F): 97.6 (10 Sylvester 2025 10:10), Max: 98 (09 Jan 2025 16:10)  HR: 51 (10 Sylvester 2025 10:10) (51 - 65)  BP: 111/48 (10 Sylvester 2025 10:10) (111/48 - 161/64)  BP(mean): 97 (09 Jan 2025 23:49) (97 - 97)  RR: 16 (10 Sylvester 2025 10:10) (16 - 18)  SpO2: 95% (10 Sylvester 2025 10:10) (95% - 100%)    Parameters below as of 10 Sylvester 2025 10:10  Patient On (Oxygen Delivery Method): room air        PHYSICAL EXAM:  GENERAL: no acute distress, comfortably in bed  HEENT: Atraumatic, normocephalic, non-icteric, no JVD, Lt eye chronic blindness, closed and unable to assess, Rt eye intact ECOM, pupil 5mm in size, reactive to light, intact vision field, oval shape  NEURO: No focal deficits, moving all extremities spontaneously, A&Ox3, no dysarthria, CN II-XII grossly intact  PSYCH: Normal affect, calm, appropriate insight and judgment, fluent speech  LUNGS: CTAB, no wrr, non-labored breathing  HEART: RRR, no murmur appreciated  ABD: Soft, non-tender, non-distended, no organomegaly, no appreciable masses, +bs all 4 quadrants  EXTREMITIES: Nontender, no clubbing, cyanosis, or edema  SKIN: No rashes or lesions    LABS:                        14.3   5.62  )-----------( 164      ( 10 Sylvester 2025 06:29 )             41.1     01-10    142  |  110[H]  |  19[H]  ----------------------------<  119[H]  4.1   |  25  |  0.93    Ca    9.7      10 Sylvester 2025 06:29  Phos  3.5     01-10  Mg     2.4     01-10    TPro  7.0  /  Alb  3.8  /  TBili  1.0  /  DBili  x   /  AST  20  /  ALT  21  /  AlkPhos  93  01-10    PT/INR - ( 09 Jan 2025 17:15 )   PT: 11.4 sec;   INR: 0.98 ratio         PTT - ( 09 Jan 2025 17:15 )  PTT:29.8 sec  Urinalysis Basic - ( 10 Sylvester 2025 06:29 )    Color: x / Appearance: x / SG: x / pH: x  Gluc: 119 mg/dL / Ketone: x  / Bili: x / Urobili: x   Blood: x / Protein: x / Nitrite: x   Leuk Esterase: x / RBC: x / WBC x   Sq Epi: x / Non Sq Epi: x / Bacteria: x      CAPILLARY BLOOD GLUCOSE          RADIOLOGY & ADDITIONAL TESTS:     Patient is a 84y old  Male who presents with a chief complaint of CVA (10 Sylvester 2025 10:03)      OVERNIGHT EVENTS/INTERVAL HPI:  -no acute event ON, AVSS.  -vision improving compared to before, intact ECOM, pupil 5mm in size, reactive to light, intact vision field, denies eye pain, drainage, pus  -Pt denies chest pain, chest palpation, sob, difficulty in breathing, HA, fever, chill    MEDICATIONS  (STANDING):  amLODIPine   Tablet 10 milliGRAM(s) Oral daily  aspirin enteric coated 81 milliGRAM(s) Oral daily  atorvastatin 80 milliGRAM(s) Oral at bedtime  dorzolamide 2% Ophthalmic Solution 1 Drop(s) Both EYES three times a day  enoxaparin Injectable 40 milliGRAM(s) SubCutaneous every 24 hours  isosorbide   dinitrate Tablet (ISORDIL) 20 milliGRAM(s) Oral three times a day  latanoprost 0.005% Ophthalmic Solution 1 Drop(s) Both EYES at bedtime  lisinopril 40 milliGRAM(s) Oral daily  metoprolol tartrate 25 milliGRAM(s) Oral daily  timolol 0.5% Solution 1 Drop(s) Both EYES daily    MEDICATIONS  (PRN):  acetaminophen     Tablet .. 650 milliGRAM(s) Oral every 6 hours PRN Temp greater or equal to 38C (100.4F), Mild Pain (1 - 3)  melatonin 3 milliGRAM(s) Oral at bedtime PRN Insomnia      Allergies    No Known Allergies    Intolerances        REVIEW OF SYSTEMS: stated as above  OBJECTIVE:  Vital Signs Last 24 Hrs  T(C): 36.4 (10 Sylvester 2025 10:10), Max: 36.7 (09 Jan 2025 16:10)  T(F): 97.6 (10 Sylvester 2025 10:10), Max: 98 (09 Jan 2025 16:10)  HR: 51 (10 Sylvester 2025 10:10) (51 - 65)  BP: 111/48 (10 Sylvester 2025 10:10) (111/48 - 161/64)  BP(mean): 97 (09 Jan 2025 23:49) (97 - 97)  RR: 16 (10 Sylvester 2025 10:10) (16 - 18)  SpO2: 95% (10 Sylvester 2025 10:10) (95% - 100%)    Parameters below as of 10 Sylvester 2025 10:10  Patient On (Oxygen Delivery Method): room air        PHYSICAL EXAM:  GENERAL: no acute distress, comfortably in bed  HEENT: Atraumatic, normocephalic, non-icteric, no JVD, Lt eye chronic blindness, closed and unable to assess, Rt eye intact ECOM, pupil 5mm in size, reactive to light, intact vision field, oval shape  NEURO: No focal deficits, moving all extremities spontaneously, A&Ox3, no dysarthria, CN II-XII grossly intact  PSYCH: Normal affect, calm, appropriate insight and judgment, fluent speech  LUNGS: CTAB, no wrr, non-labored breathing  HEART: RRR, no murmur appreciated  ABD: Soft, non-tender, non-distended, no organomegaly, no appreciable masses, +bs all 4 quadrants  EXTREMITIES: Nontender, no clubbing, cyanosis, or edema  SKIN: No rashes or lesions    LABS:                        14.3   5.62  )-----------( 164      ( 10 Sylvester 2025 06:29 )             41.1     01-10    142  |  110[H]  |  19[H]  ----------------------------<  119[H]  4.1   |  25  |  0.93    Ca    9.7      10 Sylvester 2025 06:29  Phos  3.5     01-10  Mg     2.4     01-10    TPro  7.0  /  Alb  3.8  /  TBili  1.0  /  DBili  x   /  AST  20  /  ALT  21  /  AlkPhos  93  01-10    PT/INR - ( 09 Jan 2025 17:15 )   PT: 11.4 sec;   INR: 0.98 ratio         PTT - ( 09 Jan 2025 17:15 )  PTT:29.8 sec  Urinalysis Basic - ( 10 Sylvester 2025 06:29 )    Color: x / Appearance: x / SG: x / pH: x  Gluc: 119 mg/dL / Ketone: x  / Bili: x / Urobili: x   Blood: x / Protein: x / Nitrite: x   Leuk Esterase: x / RBC: x / WBC x   Sq Epi: x / Non Sq Epi: x / Bacteria: x      CAPILLARY BLOOD GLUCOSE          RADIOLOGY & ADDITIONAL TESTS:

## 2025-01-10 NOTE — H&P ADULT - PROBLEM SELECTOR PLAN 1
P/w with right eye vision loss  Vitals: temp 98, HR 64, /73  cth/angio/neck: neg   Code stroke NIHSS    0 called in ED  EKG showing NSR   -ASA  -hold plavix 75 mg for now  -atorvastatin 80 mg   -telemetry  -f/u a1c  -f/u lipids  -f/u TTE  consider mri brain ( mri form in the chart)  -neuro in AM P/w with right eye vision loss  Vitals: temp 98, HR 64, /73  cth/angio/neck: neg   Code stroke NIHSS    0 called in ED  admitted fot cva work up  EKG showing NSR   -ASA  -hold plavix 75 mg for now  -atorvastatin 80 mg   -telemetry  -f/u a1c  -f/u lipids  -f/u TTE  consider mri brain ( mri form in the chart)  -neuro in AM

## 2025-01-10 NOTE — H&P ADULT - NSHPPHYSICALEXAM_GEN_ALL_CORE
T(C): 36.4 (01-09-25 @ 23:49), Max: 36.7 (01-09-25 @ 16:10)  HR: 65 (01-09-25 @ 23:49) (64 - 65)  BP: 148/71 (01-09-25 @ 23:49) (148/71 - 149/73)  RR: 17 (01-09-25 @ 23:49) (17 - 18)  SpO2: 98% (01-09-25 @ 23:49) (98% - 100%)    CONSTITUTIONAL: Well groomed, no apparent distress  EYES: inability to open his left eye ( chronic)  RESP: No respiratory distress, no use of accessory muscles; CTA b/l, no WRR  CV: RRR, +S1S2, no MRG; no JVD; no peripheral edema  GI: Soft, NT, ND, no rebound, no guarding; no palpable masses; no hepatosplenomegaly; no hernia palpated  LYMPH: No cervical LAD or tenderness; no axillary LAD or tenderness; no inguinal LAD or tenderness  MSK: Normal gait; No digital clubbing or cyanosis; examination of the (head/neck/spine/ribs/pelvis, RUE, LUE, RLE, LLE) without misalignment,            Normal ROM without pain, no spinal tenderness, normal muscle strength/tone  SKIN: No rashes or ulcers noted; no subcutaneous nodules or induration palpable  NEURO: CN II-XII intact; normal reflexes in upper and lower extremities, sensation intact in upper and lower extremities b/l to light touch   PSYCH: Appropriate insight/judgment; A+O x 3, mood and affect appropriate, recent/remote memory intact

## 2025-01-10 NOTE — CHART NOTE - NSCHARTNOTEFT_GEN_A_CORE
84-year-old male, AAOx3, ambulates with a  walker with left eye blindness, HTN, cad s/p CABG , hld, presents reporting episode of right eye vision loss that occurred 3 days ago. Admitted for CVA vs TIA work up.    # R blurry vision  # L eye blindness  # HTN  # CAD s/p CABG    - patient reports complete improvement of the blurry vision now, denies other neurological symptoms  - f/u TTE, brain MRI, monitor on telemetry  - neurology consulted, f/u formal recs  - start DAPT for potential TIA, continue home meds

## 2025-01-10 NOTE — PROGRESS NOTE ADULT - ASSESSMENT
84-year-old male, AAOx3, ambulates with a  walker with left eye blindness, Rt eye glaucoma, HTN, cad s/p CABG , hld, presents reporting episode of right eye vision loss that occurred 3 days ago. Patient is being admitted for CVA vs TIA work up.

## 2025-01-10 NOTE — CONSULT NOTE ADULT - SUBJECTIVE AND OBJECTIVE BOX
Cardiology Consult Note   [Please check [email or web address] password: [email or web address]" for cardiology service schedule and contact information]    History of Present Illness:       PAST MEDICAL & SURGICAL HISTORY:  HTN (hypertension)      CAD (coronary artery disease)      HLD (hyperlipidemia)      Stented coronary artery      History of cholecystectomy        FAMILY HISTORY:    SOCIAL HISTORY:  unchanged    MEDICATIONS:  amLODIPine   Tablet 10 milliGRAM(s) Oral daily  aspirin enteric coated 81 milliGRAM(s) Oral daily  enoxaparin Injectable 40 milliGRAM(s) SubCutaneous every 24 hours  isosorbide   dinitrate Tablet (ISORDIL) 20 milliGRAM(s) Oral three times a day  lisinopril 40 milliGRAM(s) Oral daily  metoprolol tartrate 25 milliGRAM(s) Oral daily        acetaminophen     Tablet .. 650 milliGRAM(s) Oral every 6 hours PRN  melatonin 3 milliGRAM(s) Oral at bedtime PRN      atorvastatin 80 milliGRAM(s) Oral at bedtime    dorzolamide 2% Ophthalmic Solution 1 Drop(s) Both EYES three times a day  latanoprost 0.005% Ophthalmic Solution 1 Drop(s) Both EYES at bedtime  timolol 0.5% Solution 1 Drop(s) Both EYES daily      REVIEW OF SYSTEMS:  CV: chest pain (-), palpitation (-), orthopnea (-), PND (-), edema (-)  PULM: SOB (-), cough (-), wheezing (-), hemoptysis (-).   CONST: fever (-), chills (-) or fatigue (-)  GI: abdominal distension (-), abdominal pain (-) , nausea/vomiting (-), hematemesis, (-), melena (-), hematochezia (-)  : dysuria (-), frequency (-), hematuria (-).   NEURO: numbness (-), weakness (-), dizziness (-)  SKIN: itching (-), rash (-)  HEENT:  visual changes (-); vertigo or throat pain (-);  neck stiffness (-)     All other review of systems is negative unless indicated above.   -------------------------------------------------------------------------------------------  PHYSICAL EXAM:  T(C): 36.5 (01-10-25 @ 05:10), Max: 36.7 (01-09-25 @ 16:10)  HR: 59 (01-10-25 @ 07:43) (55 - 65)  BP: 161/64 (01-10-25 @ 07:43) (130/72 - 161/64)  RR: 18 (01-10-25 @ 05:10) (17 - 18)  SpO2: 97% (01-10-25 @ 05:10) (97% - 100%)  Wt(kg): --  I&O's Summary      General: No acute distress. Awake and conversant.   Eyes: Normal conjunctiva, anicteric. Round symmetric pupils.   ENT: Hearing grossly intact. No nasal discharge.   Neck: Neck is supple. No masses or thyromegaly.   Respiratory: Respirations are non-labored. No wheezing.   Skin: Warm. No rashes or ulcers.   Psych: Alert and oriented. Cooperative, Appropriate mood and affect, Normal judgment.   CV: No lower extremity edema.   MSK: Normal ambulation. No clubbing or cyanosis.   Neuro:     -------------------------------------------------------------------------------------------  LABS:  01-10    142  |  110[H]  |  19[H]  ----------------------------<  119[H]  4.1   |  25  |  0.93    Ca    9.7      10 Sylvester 2025 06:29  Phos  3.5     01-10  Mg     2.4     01-10    TPro  7.0  /  Alb  3.8  /  TBili  1.0  /  DBili  x   /  AST  20  /  ALT  21  /  AlkPhos  93  01-10    Creatinine Trend: 0.93<--, 0.87<--                        14.3   5.62  )-----------( 164      ( 10 Sylvester 2025 06:29 )             41.1     PT/INR - ( 09 Jan 2025 17:15 )   PT: 11.4 sec;   INR: 0.98 ratio         PTT - ( 09 Jan 2025 17:15 )  PTT:29.8 sec    Lipid Panel: amLODIPine   Tablet 10 milliGRAM(s) Oral daily  aspirin enteric coated 81 milliGRAM(s) Oral daily  enoxaparin Injectable 40 milliGRAM(s) SubCutaneous every 24 hours  isosorbide   dinitrate Tablet (ISORDIL) 20 milliGRAM(s) Oral three times a day  lisinopril 40 milliGRAM(s) Oral daily  metoprolol tartrate 25 milliGRAM(s) Oral daily    Cardiac Enzymes:         -------------------------------------------------------------------------------------------  Meds:    -------------------------------------------------------------------------------------------  Cardiovascular Diagnostic Testing:    ECG:     Echo:     Stress Testing:    Cath:    Imaging:    CXR:  reviewed  -------------------------------------------------------------------------------------------  Problems Assessed:   1.  2.  3.  4.    Plan:   •  •  •  •  -------------------------------------------------------------------------------------------  Billing Statement:   76/56/51/36 minutes spent on total encounter. Necessity of time spent during this encounter on this date of service was due to review of medical information in EMR, co-ordination of hospital and outpatient care, discussion with patient and communication with primary team.   -------------------------------------------------------------------------------------------  Berlin Johnson MD   of Cardiology  [facility] at 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  Patient reports upon waking having a period of inability to see upon waking up in his right eye for about 15 minutes on and off.  Reports the following day he saw his primary care doctor who then scheduled him to see ophthalmologist.  Reports yesterday being seen by ophthalmology and being told today to come to the ED out of concern for possible CVA versus TIA.   His vision now has normalized. He reports this has never happened to him before. Denies any fever, chills, n/v, cp, sob, abdominal pain, n/v, or diarrhea.     ED course:  Vitals: temp 98, HR 64, /73  cth/angio/neck: neg       PAST MEDICAL & SURGICAL HISTORY:  HTN (hypertension)      CAD (coronary artery disease)      HLD (hyperlipidemia)      Stented coronary artery      History of cholecystectomy        FAMILY HISTORY:    SOCIAL HISTORY:  unchanged    MEDICATIONS:  amLODIPine   Tablet 10 milliGRAM(s) Oral daily  aspirin enteric coated 81 milliGRAM(s) Oral daily  enoxaparin Injectable 40 milliGRAM(s) SubCutaneous every 24 hours  isosorbide   dinitrate Tablet (ISORDIL) 20 milliGRAM(s) Oral three times a day  lisinopril 40 milliGRAM(s) Oral daily  metoprolol tartrate 25 milliGRAM(s) Oral daily        acetaminophen     Tablet .. 650 milliGRAM(s) Oral every 6 hours PRN  melatonin 3 milliGRAM(s) Oral at bedtime PRN      atorvastatin 80 milliGRAM(s) Oral at bedtime    dorzolamide 2% Ophthalmic Solution 1 Drop(s) Both EYES three times a day  latanoprost 0.005% Ophthalmic Solution 1 Drop(s) Both EYES at bedtime  timolol 0.5% Solution 1 Drop(s) Both EYES daily      REVIEW OF SYSTEMS:  CV: chest pain (-), palpitation (-), orthopnea (-), PND (-), edema (-)  PULM: SOB (-), cough (-), wheezing (-), hemoptysis (-).   CONST: fever (-), chills (-) or fatigue (-)  GI: abdominal distension (-), abdominal pain (-) , nausea/vomiting (-), hematemesis, (-), melena (-), hematochezia (-)  : dysuria (-), frequency (-), hematuria (-).   NEURO: numbness (-), weakness (-), dizziness (-)  SKIN: itching (-), rash (-)  HEENT:  visual changes (-); vertigo or throat pain (-);  neck stiffness (-)     All other review of systems is negative unless indicated above.   -------------------------------------------------------------------------------------------  PHYSICAL EXAM:  T(C): 36.5 (01-10-25 @ 05:10), Max: 36.7 (01-09-25 @ 16:10)  HR: 59 (01-10-25 @ 07:43) (55 - 65)  BP: 161/64 (01-10-25 @ 07:43) (130/72 - 161/64)  RR: 18 (01-10-25 @ 05:10) (17 - 18)  SpO2: 97% (01-10-25 @ 05:10) (97% - 100%)  Wt(kg): --  I&O's Summary      General: No acute distress. Awake and conversant.   Eyes: Normal conjunctiva, anicteric. Round symmetric pupils.   ENT: Hearing grossly intact. No nasal discharge.   Neck: Neck is supple. No masses or thyromegaly.   Respiratory: Respirations are non-labored. No wheezing.   Skin: Warm. No rashes or ulcers.   Psych: Alert and oriented. Cooperative, Appropriate mood and affect, Normal judgment.   CV: No lower extremity edema.   MSK: Normal ambulation. No clubbing or cyanosis.   Neuro:     -------------------------------------------------------------------------------------------  LABS:  01-10    142  |  110[H]  |  19[H]  ----------------------------<  119[H]  4.1   |  25  |  0.93    Ca    9.7      10 Sylvester 2025 06:29  Phos  3.5     01-10  Mg     2.4     01-10    TPro  7.0  /  Alb  3.8  /  TBili  1.0  /  DBili  x   /  AST  20  /  ALT  21  /  AlkPhos  93  01-10    Creatinine Trend: 0.93<--, 0.87<--                        14.3   5.62  )-----------( 164      ( 10 Sylvester 2025 06:29 )             41.1     PT/INR - ( 09 Jan 2025 17:15 )   PT: 11.4 sec;   INR: 0.98 ratio         PTT - ( 09 Jan 2025 17:15 )  PTT:29.8 sec    Lipid Panel: amLODIPine   Tablet 10 milliGRAM(s) Oral daily  aspirin enteric coated 81 milliGRAM(s) Oral daily  enoxaparin Injectable 40 milliGRAM(s) SubCutaneous every 24 hours  isosorbide   dinitrate Tablet (ISORDIL) 20 milliGRAM(s) Oral three times a day  lisinopril 40 milliGRAM(s) Oral daily  metoprolol tartrate 25 milliGRAM(s) Oral daily    Cardiac Enzymes:         -------------------------------------------------------------------------------------------  Meds:    -------------------------------------------------------------------------------------------  Cardiovascular Diagnostic Testing:    ECG:     Echo:     Stress Testing:    Cath:    Imaging:    CXR:  reviewed  -------------------------------------------------------------------------------------------  Problems Assessed:   1.  2.  3.  4.    Plan:   •  •  •  •  -------------------------------------------------------------------------------------------  Billing Statement:   76/56/51/36 minutes spent on total encounter. Necessity of time spent during this encounter on this date of service was due to review of medical information in EMR, co-ordination of hospital and outpatient care, discussion with patient and communication with primary team.   -------------------------------------------------------------------------------------------  Berlin Johnson MD   of Cardiology  [facility] at [address], [address]  [place] [address]  Phone: [phone]  Fax: [phone] Cardiology Consult Note   [Please check [email or web address] password: [email or web address]" for cardiology service schedule and contact information]    History of Present Illness:   84-year-old male, AAOx3, ambulates with a  walker with left eye blindness, HTN, cad s/p CABG , hld, presents reporting episode of right eye vision loss that occurred 3 days ago.  Patient reports upon waking having a period of inability to see upon waking up in his right eye for about 15 minutes on and off.  Reports the following day he saw his primary care doctor who then scheduled him to see ophthalmologist.  Reports yesterday being seen by ophthalmology and being told today to come to the ED out of concern for possible CVA versus TIA.   His vision now has normalized. He reports this has never happened to him before. Denies any fever, chills, n/v, cp, sob, abdominal pain, n/v, or diarrhea.     ED course:  Vitals: temp 98, HR 64, /73  cth/angio/neck: neg       PAST MEDICAL & SURGICAL HISTORY:  HTN (hypertension)      CAD (coronary artery disease)      HLD (hyperlipidemia)      Stented coronary artery      History of cholecystectomy        FAMILY HISTORY:    SOCIAL HISTORY:  unchanged    MEDICATIONS:  amLODIPine   Tablet 10 milliGRAM(s) Oral daily  aspirin enteric coated 81 milliGRAM(s) Oral daily  enoxaparin Injectable 40 milliGRAM(s) SubCutaneous every 24 hours  isosorbide   dinitrate Tablet (ISORDIL) 20 milliGRAM(s) Oral three times a day  lisinopril 40 milliGRAM(s) Oral daily  metoprolol tartrate 25 milliGRAM(s) Oral daily        acetaminophen     Tablet .. 650 milliGRAM(s) Oral every 6 hours PRN  melatonin 3 milliGRAM(s) Oral at bedtime PRN      atorvastatin 80 milliGRAM(s) Oral at bedtime    dorzolamide 2% Ophthalmic Solution 1 Drop(s) Both EYES three times a day  latanoprost 0.005% Ophthalmic Solution 1 Drop(s) Both EYES at bedtime  timolol 0.5% Solution 1 Drop(s) Both EYES daily      REVIEW OF SYSTEMS:  CV: chest pain (-), palpitation (-), orthopnea (-), PND (-), edema (-)  PULM: SOB (-), cough (-), wheezing (-), hemoptysis (-).   CONST: fever (-), chills (-) or fatigue (-)  GI: abdominal distension (-), abdominal pain (-) , nausea/vomiting (-), hematemesis, (-), melena (-), hematochezia (-)  : dysuria (-), frequency (-), hematuria (-).   NEURO: numbness (-), weakness (-), dizziness (-)  SKIN: itching (-), rash (-)  HEENT:  visual changes (-); vertigo or throat pain (-);  neck stiffness (-)     All other review of systems is negative unless indicated above.   -------------------------------------------------------------------------------------------  PHYSICAL EXAM:  T(C): 36.5 (01-10-25 @ 05:10), Max: 36.7 (01-09-25 @ 16:10)  HR: 59 (01-10-25 @ 07:43) (55 - 65)  BP: 161/64 (01-10-25 @ 07:43) (130/72 - 161/64)  RR: 18 (01-10-25 @ 05:10) (17 - 18)  SpO2: 97% (01-10-25 @ 05:10) (97% - 100%)  Wt(kg): --  I&O's Summary      CONSTITUTIONAL: Well groomed, no apparent distress  EYES: inability to open his left eye ( chronic)  RESP: No respiratory distress, no use of accessory muscles; CTA b/l, no WRR  CV: RRR, +S1S2, no MRG; no JVD; no peripheral edema  GI: Soft, NT, ND, no rebound, no guarding; no palpable masses; no hepatosplenomegaly; no hernia palpated  LYMPH: No cervical LAD or tenderness; no axillary LAD or tenderness; no inguinal LAD or tenderness  MSK: Normal gait; No digital clubbing or cyanosis; examination of the (head/neck/spine/ribs/pelvis, RUE, LUE, RLE, LLE) without misalignment,            Normal ROM without pain, no spinal tenderness, normal muscle strength/tone  SKIN: No rashes or ulcers noted; no subcutaneous nodules or induration palpable         -------------------------------------------------------------------------------------------  LABS:  01-10    142  |  110[H]  |  19[H]  ----------------------------<  119[H]  4.1   |  25  |  0.93    Ca    9.7      10 Sylvester 2025 06:29  Phos  3.5     01-10  Mg     2.4     01-10    TPro  7.0  /  Alb  3.8  /  TBili  1.0  /  DBili  x   /  AST  20  /  ALT  21  /  AlkPhos  93  01-10    Creatinine Trend: 0.93<--, 0.87<--                        14.3   5.62  )-----------( 164      ( 10 Sylvester 2025 06:29 )             41.1     PT/INR - ( 09 Jan 2025 17:15 )   PT: 11.4 sec;   INR: 0.98 ratio         PTT - ( 09 Jan 2025 17:15 )  PTT:29.8 sec    Lipid Panel: amLODIPine   Tablet 10 milliGRAM(s) Oral daily  aspirin enteric coated 81 milliGRAM(s) Oral daily  enoxaparin Injectable 40 milliGRAM(s) SubCutaneous every 24 hours  isosorbide   dinitrate Tablet (ISORDIL) 20 milliGRAM(s) Oral three times a day  lisinopril 40 milliGRAM(s) Oral daily  metoprolol tartrate 25 milliGRAM(s) Oral daily    Cardiac Enzymes:         -------------------------------------------------------------------------------------------  Meds:    -------------------------------------------------------------------------------------------  Cardiovascular Diagnostic Testing:    ECG:     Echo:     Stress Testing:    Cath:    Imaging:    CXR:  reviewed  -------------------------------------------------------------------------------------------  Problems Assessed:   1. Possible acute CVA/TIA- neurological workup is ongoing   2. CAD s/p CABG- no anginal symptoms.   3. HTN- stable.   4. HLD- stable.     Plan:   • no chest pain- continue DAPT, and atorvastatin.   • continue amlodipine, metoprolol and lisinopril at current dose.   • obtain echocardiogram to assess for cardiac source of emboli  • neurology workup is ongoing, MRI brain pending.   • continue to monitor on telemetry    -------------------------------------------------------------------------------------------  Berlin Johnson MD   of Cardiology  [facility] at [facility]/[address], [address]  [place] [address]  Phone: [phone]  Fax: [phone]

## 2025-01-10 NOTE — PROGRESS NOTE ADULT - PROBLEM SELECTOR PLAN 1
P/w with right eye vision loss  Vitals: temp 98, HR 64, /73  cth/angio/neck: neg   Code stroke NIHSS    0 called in ED  admitted fot cva work up  EKG showing NSR   lipid panel wnl      -ASA  -hold plavix 75 mg for now  -atorvastatin 80 mg   -telemetry  -f/u a1c  -pending TTE  consider mri brain ( mri form in the chart)  -f/u neuro consult P/w with right eye vision loss  Vitals: temp 98, HR 64, /73  cth/angio/neck: neg   Code stroke NIHSS    0 called in ED  admitted fot cva work up  EKG showing NSR   lipid panel wnl      -c/w ASA 81mg QD, atorvastatin 80 mg QD  -f/u a1c  -pending TTE  -pending mri brain ( mri form in the chart)  -f/u neuro consult P/w with right eye vision loss  Vitals: temp 98, HR 64, /73  cth/angio/neck: neg   Code stroke NIHSS    0 called in ED  admitted fot cva work up  EKG showing NSR   lipid panel wnl      -c/w ASA 81mg QD, atorvastatin 80 mg QD  -f/u a1c  -pending TTE  -pending mri brain w/wo contrast. mri orbits w/wo contrast  -Dr. King following P/w with right eye vision loss  Vitals: temp 98, HR 64, /73  cth/angio/neck: neg   Code stroke NIHSS    0 called in ED  admitted fot cva work up  EKG showing NSR   lipid panel wnl    xanax 0.25 prn q 12    -c/w ASA 81mg QD, atorvastatin 80 mg QD  -c/w plavix  -f/u a1c  -pending TTE  -pending mri brain w/wo contrast. mri orbits w/wo contrast  -Dr. King following

## 2025-01-10 NOTE — CONSULT NOTE ADULT - TIME BILLING
I counseled the primary team about the testing indicated to complete the TIA/stroke workup, as well as the medications to maintain for stroke prevention.

## 2025-01-10 NOTE — H&P ADULT - ASSESSMENT
84-year-old male, AAOx3, ambulates with a  walker with left eye blindness, HTN, cad s/p CABG , hld, presents reporting episode of right eye vision loss that occurred 3 days ago. Patient is being admitted for CVA vs TIA work up.

## 2025-01-11 PROCEDURE — 99232 SBSQ HOSP IP/OBS MODERATE 35: CPT

## 2025-01-11 RX ORDER — ALPRAZOLAM 0.25 MG/1
0.25 TABLET ORAL ONCE
Refills: 0 | Status: DISCONTINUED | OUTPATIENT
Start: 2025-01-11 | End: 2025-01-13

## 2025-01-11 RX ADMIN — Medication 81 MILLIGRAM(S): at 12:53

## 2025-01-11 RX ADMIN — Medication 1 DROP(S): at 22:15

## 2025-01-11 RX ADMIN — ENOXAPARIN SODIUM 40 MILLIGRAM(S): 60 INJECTION INTRAVENOUS; SUBCUTANEOUS at 10:39

## 2025-01-11 RX ADMIN — Medication 1 DROP(S): at 12:54

## 2025-01-11 RX ADMIN — ATORVASTATIN CALCIUM 80 MILLIGRAM(S): 40 TABLET, FILM COATED ORAL at 22:15

## 2025-01-11 RX ADMIN — CLOPIDOGREL BISULFATE 75 MILLIGRAM(S): 75 TABLET, FILM COATED ORAL at 12:53

## 2025-01-11 RX ADMIN — LATANOPROST 1 DROP(S): 50 SOLUTION OPHTHALMIC at 22:40

## 2025-01-11 RX ADMIN — Medication 1 DROP(S): at 06:10

## 2025-01-11 RX ADMIN — Medication 1 DROP(S): at 14:48

## 2025-01-11 NOTE — DIETITIAN INITIAL EVALUATION ADULT - ADD RECOMMEND
Severe malnutrition; Recommend to liberalize current diet order to regular diet as medically feasible.

## 2025-01-11 NOTE — DIETITIAN INITIAL EVALUATION ADULT - PROBLEM SELECTOR PLAN 1
P/w with right eye vision loss  Vitals: temp 98, HR 64, /73  cth/angio/neck: neg   Code stroke NIHSS    0 called in ED  admitted fot cva work up  EKG showing NSR   -ASA  -hold plavix 75 mg for now  -atorvastatin 80 mg   -telemetry  -f/u a1c  -f/u lipids  -f/u TTE  consider mri brain ( mri form in the chart)  -neuro in AM

## 2025-01-11 NOTE — DIETITIAN INITIAL EVALUATION ADULT - PERTINENT LABORATORY DATA
01-10    142  |  110[H]  |  19[H]  ----------------------------<  119[H]  4.1   |  25  |  0.93    Ca    9.7      10 Sylvester 2025 06:29  Phos  3.5     01-10  Mg     2.4     01-10    TPro  7.0  /  Alb  3.8  /  TBili  1.0  /  DBili  x   /  AST  20  /  ALT  21  /  AlkPhos  93  01-10  A1C with Estimated Average Glucose Result: 6.0 % (01-10-25 @ 06:29)

## 2025-01-11 NOTE — PHYSICAL THERAPY INITIAL EVALUATION ADULT - GAIT DEVIATIONS NOTED, PT EVAL
mild left steppage gait with intermittent left knee buckling during stance phase/decreased jane/increased time in double stance/decreased velocity of limb motion/decreased step length

## 2025-01-11 NOTE — PROGRESS NOTE ADULT - SUBJECTIVE AND OBJECTIVE BOX
Cardiology Progress Note  ------------------------------------------------------------------------------------------  SUBJECTIVE:   No events overnight. Denies CP, SOB or Palpitations.   -------------------------------------------------------------------------------------------  ROS:  CV: chest pain (-), palpitation (-), orthopnea (-), PND (-), edema (-)  PULM: SOB (-), cough (-), wheezing (-), hemoptysis (-).   CONST: fever (-), chills (-) or fatigue (-)  GI: abdominal distension (-), abdominal pain (-) , nausea/vomiting (-), hematemesis, (-), melena (-), hematochezia (-)  : dysuria (-), frequency (-), hematuria (-).   NEURO: numbness (-), weakness (-), dizziness (-)  MSK: myalgia (-), joint pain (-)   SKIN: itching (-), rash (-)  HEENT:  visual changes (-); vertigo or throat pain (-);  neck stiffness (-)   Psych: change in mood (-), anxiety (-), depression (-)     All other review of systems is negative unless indicated above.   -------------------------------------------------------------------------------------------  VS:  T(F): 97.7 (01-11), Max: 99 (01-10)  HR: 56 (01-11) (56 - 57)  BP: 100/42 (01-11) (100/42 - 138/76)  RR: 16 (01-10)  SpO2: 98% (01-11)  I&O's Summary    ------------------------------------------------------------------------------------------  PHYSICAL EXAM:  General: No acute distress. Awake and conversant.   Eyes: Normal conjunctiva, anicteric. Round symmetric pupils.   ENT: Hearing grossly intact. No nasal discharge.   Neck: Neck is supple. No masses or thyromegaly.   Respiratory: Respirations are non-labored. No wheezing.   Skin: Warm. No rashes or ulcers.   Psych: Alert and oriented. Cooperative, Appropriate mood and affect, Normal judgment.   CV: No lower extremity edema.   MSK: Normal ambulation. No clubbing or cyanosis.   Neuro: Sensation and CN II-XII grossly normal.  -------------------------------------------------------------------------------------------  LABS:  01-10    142  |  110[H]  |  19[H]  ----------------------------<  119[H]  4.1   |  25  |  0.93    Ca    9.7      10 Sylvester 2025 06:29  Phos  3.5     01-10  Mg     2.4     01-10    TPro  7.0  /  Alb  3.8  /  TBili  1.0  /  DBili  x   /  AST  20  /  ALT  21  /  AlkPhos  93  01-10    Creatinine Trend: 0.93<--, 0.87<--                        14.3   5.62  )-----------( 164      ( 10 Sylvester 2025 06:29 )             41.1     PT/INR - ( 09 Jan 2025 17:15 )   PT: 11.4 sec;   INR: 0.98 ratio         PTT - ( 09 Jan 2025 17:15 )  PTT:29.8 sec    Lipid Panel: T(F): 97.7 (01-11), Max: 99 (01-10)  HR: 56 (01-11) (56 - 57)  BP: 100/42 (01-11) (100/42 - 138/76)  RR: 16 (01-10)  SpO2: 98% (01-11)  Cardiac Enzymes:   -------------------------------------------------------------------------------------------  Meds:  acetaminophen     Tablet .. 650 milliGRAM(s) Oral every 6 hours PRN  ALPRAZolam 0.25 milliGRAM(s) Oral two times a day PRN  amLODIPine   Tablet 10 milliGRAM(s) Oral daily  aspirin enteric coated 81 milliGRAM(s) Oral daily  atorvastatin 80 milliGRAM(s) Oral at bedtime  clopidogrel Tablet 75 milliGRAM(s) Oral daily  dorzolamide 2% Ophthalmic Solution 1 Drop(s) Both EYES three times a day  enoxaparin Injectable 40 milliGRAM(s) SubCutaneous every 24 hours  isosorbide   dinitrate Tablet (ISORDIL) 20 milliGRAM(s) Oral three times a day  latanoprost 0.005% Ophthalmic Solution 1 Drop(s) Both EYES at bedtime  lisinopril 40 milliGRAM(s) Oral daily  melatonin 3 milliGRAM(s) Oral at bedtime PRN  metoprolol tartrate 25 milliGRAM(s) Oral daily  timolol 0.5% Solution 1 Drop(s) Both EYES daily    -------------------------------------------------------------------------------------------  Cardiovascular Diagnostic Testing:  -------------------------------------------------------------------------------------------  ECG:   Echo:   Stress Testing:  Cath:  Imaging:  CXR:  reviewed  -------------------------------------------------------------------------------------------  Assessment and Plan:   -------------------------------------------------------------------------------------------  Problems Assessed:   1. Possible acute CVA/TIA- neurological workup is ongoing   2. CAD s/p CABG- no anginal symptoms.    3. HTN- stable.   4. HLD- stable.     Plan:   • no chest pain- continue DAPT, and atorvastatin.   • continue amlodipine, metoprolol and lisinopril at current dose.   • obtain echocardiogram to assess for cardiac source of emboli.  • neurology workup is ongoing, MRI brain pending.  • continue to monitor on telemetry.   -------------------------------------------------------------------------------------------  Billing Statement:   36 minutes spent on total encounter. Necessity of time spent during this encounter on this date of service was due to review of medical information in EMR, co-ordination of hospital and outpatient care, discussion with patient and communication with primary team.   -------------------------------------------------------------------------------------------  Berlin Johnson MD   of Cardiology  [facility]  [address], [address]  [place] [id number]  Phone: [phone]  Fax: [phone]    Please check [email or web address]: [email or web address] for cardiology service schedule and contact information.  Cardiology Progress Note  ------------------------------------------------------------------------------------------  SUBJECTIVE:   No events overnight. Denies CP, SOB or Palpitations.   -------------------------------------------------------------------------------------------  ROS:  CV: chest pain (-), palpitation (-), orthopnea (-), PND (-), edema (-)  PULM: SOB (-), cough (-), wheezing (-), hemoptysis (-).   CONST: fever (-), chills (-) or fatigue (-)  GI: abdominal distension (-), abdominal pain (-) , nausea/vomiting (-), hematemesis, (-), melena (-), hematochezia (-)  : dysuria (-), frequency (-), hematuria (-).   NEURO: numbness (-), weakness (-), dizziness (-)  MSK: myalgia (-), joint pain (-)   SKIN: itching (-), rash (-)  HEENT:  visual changes (-); vertigo or throat pain (-);  neck stiffness (-)   Psych: change in mood (-), anxiety (-), depression (-)     All other review of systems is negative unless indicated above.   -------------------------------------------------------------------------------------------  VS:  T(F): 97.7 (01-11), Max: 99 (01-10)  HR: 56 (01-11) (56 - 57)  BP: 100/42 (01-11) (100/42 - 138/76)  RR: 16 (01-10)  SpO2: 98% (01-11)  I&O's Summary    ------------------------------------------------------------------------------------------  PHYSICAL EXAM:  General: No acute distress. Awake and conversant.   Eyes inability to open his left eye ( chronic)  ENT: Hearing grossly intact. No nasal discharge.   Neck: Neck is supple. No masses or thyromegaly.   Respiratory: Respirations are non-labored. No wheezing.   Skin: Warm. No rashes or ulcers.   Psych: Alert and oriented. Cooperative, Appropriate mood and affect, Normal judgment.   CV: No lower extremity edema.   MSK: Normal ambulation. No clubbing or cyanosis.   Neuro: Sensation and CN II-XII grossly normal.  -------------------------------------------------------------------------------------------  LABS:  01-10    142  |  110[H]  |  19[H]  ----------------------------<  119[H]  4.1   |  25  |  0.93    Ca    9.7      10 Sylvester 2025 06:29  Phos  3.5     01-10  Mg     2.4     01-10    TPro  7.0  /  Alb  3.8  /  TBili  1.0  /  DBili  x   /  AST  20  /  ALT  21  /  AlkPhos  93  01-10    Creatinine Trend: 0.93<--, 0.87<--                        14.3   5.62  )-----------( 164      ( 10 Sylvester 2025 06:29 )             41.1     PT/INR - ( 09 Jan 2025 17:15 )   PT: 11.4 sec;   INR: 0.98 ratio         PTT - ( 09 Jan 2025 17:15 )  PTT:29.8 sec    Lipid Panel: T(F): 97.7 (01-11), Max: 99 (01-10)  HR: 56 (01-11) (56 - 57)  BP: 100/42 (01-11) (100/42 - 138/76)  RR: 16 (01-10)  SpO2: 98% (01-11)  Cardiac Enzymes:   -------------------------------------------------------------------------------------------  Meds:  acetaminophen     Tablet .. 650 milliGRAM(s) Oral every 6 hours PRN  ALPRAZolam 0.25 milliGRAM(s) Oral two times a day PRN  amLODIPine   Tablet 10 milliGRAM(s) Oral daily  aspirin enteric coated 81 milliGRAM(s) Oral daily  atorvastatin 80 milliGRAM(s) Oral at bedtime  clopidogrel Tablet 75 milliGRAM(s) Oral daily  dorzolamide 2% Ophthalmic Solution 1 Drop(s) Both EYES three times a day  enoxaparin Injectable 40 milliGRAM(s) SubCutaneous every 24 hours  isosorbide   dinitrate Tablet (ISORDIL) 20 milliGRAM(s) Oral three times a day  latanoprost 0.005% Ophthalmic Solution 1 Drop(s) Both EYES at bedtime  lisinopril 40 milliGRAM(s) Oral daily  melatonin 3 milliGRAM(s) Oral at bedtime PRN  metoprolol tartrate 25 milliGRAM(s) Oral daily  timolol 0.5% Solution 1 Drop(s) Both EYES daily    -------------------------------------------------------------------------------------------  Cardiovascular Diagnostic Testing:  -------------------------------------------------------------------------------------------  ECG:   Echo:   Stress Testing:  Cath:  Imaging:  CXR:  reviewed  -------------------------------------------------------------------------------------------  Assessment and Plan:   -------------------------------------------------------------------------------------------  Problems Assessed:   1. Possible acute CVA/TIA- neurological workup is ongoing   2. CAD s/p CABG- no anginal symptoms.    3. HTN- stable.   4. HLD- stable.     Plan:   • no chest pain- continue DAPT, and atorvastatin.   • continue amlodipine, metoprolol and lisinopril at current dose.   • obtain echocardiogram to assess for cardiac source of emboli.  • neurology workup is ongoing, MRI brain pending.  • continue to monitor on telemetry.   -------------------------------------------------------------------------------------------  Billing Statement:   36 minutes spent on total encounter. Necessity of time spent during this encounter on this date of service was due to review of medical information in EMR, co-ordination of hospital and outpatient care, discussion with patient and communication with primary team.   -------------------------------------------------------------------------------------------  Berlin Johnson MD   of Cardiology  [facility]  [address], [address] [place] [address]  Phone: [phone]  Fax: [phone]    Please check [email or web address]: [email or web address] for cardiology service schedule and contact information.

## 2025-01-11 NOTE — PHYSICAL THERAPY INITIAL EVALUATION ADULT - MANUAL MUSCLE TESTING RESULTS, REHAB EVAL
both UE/LE grossly 3+/5; right LE grossly 3+/5; left LE grossly 3/5; left DF 3-/5 (claims sx chronic ??)

## 2025-01-11 NOTE — PHYSICAL THERAPY INITIAL EVALUATION ADULT - PERTINENT HX OF CURRENT PROBLEM, REHAB EVAL
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

## 2025-01-11 NOTE — PHYSICAL THERAPY INITIAL EVALUATION ADULT - ADDITIONAL COMMENTS
Pt lives with spouse in an apartment building, was mod (I) in basic ADLs however has been falling recently due to c/o gait instability, LE weakness. Pt's wife is also dependent with use of rollator and has HHA

## 2025-01-11 NOTE — DIETITIAN INITIAL EVALUATION ADULT - PERTINENT MEDS FT
MEDICATIONS  (STANDING):  amLODIPine   Tablet 10 milliGRAM(s) Oral daily  aspirin enteric coated 81 milliGRAM(s) Oral daily  atorvastatin 80 milliGRAM(s) Oral at bedtime  clopidogrel Tablet 75 milliGRAM(s) Oral daily  dorzolamide 2% Ophthalmic Solution 1 Drop(s) Both EYES three times a day  enoxaparin Injectable 40 milliGRAM(s) SubCutaneous every 24 hours  isosorbide   dinitrate Tablet (ISORDIL) 20 milliGRAM(s) Oral three times a day  latanoprost 0.005% Ophthalmic Solution 1 Drop(s) Both EYES at bedtime  lisinopril 40 milliGRAM(s) Oral daily  metoprolol tartrate 25 milliGRAM(s) Oral daily  timolol 0.5% Solution 1 Drop(s) Both EYES daily    MEDICATIONS  (PRN):  acetaminophen     Tablet .. 650 milliGRAM(s) Oral every 6 hours PRN Temp greater or equal to 38C (100.4F), Mild Pain (1 - 3)  ALPRAZolam 0.25 milliGRAM(s) Oral two times a day PRN Anxiety  ALPRAZolam 0.25 milliGRAM(s) Oral once PRN before MRI  melatonin 3 milliGRAM(s) Oral at bedtime PRN Insomnia

## 2025-01-11 NOTE — DIETITIAN INITIAL EVALUATION ADULT - ORAL INTAKE PTA/DIET HISTORY
Pt is from home, alert and orientated, well communicated. Pt's wife at bedside and able to participate in nutritional interview. H/o stented coronary artery, HLD, CAD, HTN; admitted for unspecified vision loss and stroke work up. Pt is with fair PO intake PTA and in-house consuming >50% of meals.  lbs x 1 year expressing no significant wt loss. NFPE completed with results below. No chewing/ swallowing issues reported. Constipation x 4 days is reported; bowel regimen is in place. No food preferences at this time. No known food allergies reported. Reviewed oral supplementation options however pt refuses at this time despite positive encouragement. Pt reports that he follows therapeutic diet at home, declines nutrition education at this time.

## 2025-01-11 NOTE — PROGRESS NOTE ADULT - SUBJECTIVE AND OBJECTIVE BOX
[facility] Medicine  Patient is a 84y old  Male who presents with a chief complaint of CVA (11 Jan 2025 13:45)      SUBJECTIVE / OVERNIGHT EVENTS:  No acute events over night.   Patient seen at bedside, states his R vision is somewhat blurry but it is chronic, otherwise denies focal numbness/weakness, dysphagia.    MEDICATIONS  (STANDING):  amLODIPine   Tablet 10 milliGRAM(s) Oral daily  aspirin enteric coated 81 milliGRAM(s) Oral daily  atorvastatin 80 milliGRAM(s) Oral at bedtime  clopidogrel Tablet 75 milliGRAM(s) Oral daily  dorzolamide 2% Ophthalmic Solution 1 Drop(s) Both EYES three times a day  enoxaparin Injectable 40 milliGRAM(s) SubCutaneous every 24 hours  isosorbide   dinitrate Tablet (ISORDIL) 20 milliGRAM(s) Oral three times a day  latanoprost 0.005% Ophthalmic Solution 1 Drop(s) Both EYES at bedtime  lisinopril 40 milliGRAM(s) Oral daily  metoprolol tartrate 25 milliGRAM(s) Oral daily  timolol 0.5% Solution 1 Drop(s) Both EYES daily    MEDICATIONS  (PRN):  acetaminophen     Tablet .. 650 milliGRAM(s) Oral every 6 hours PRN Temp greater or equal to 38C (100.4F), Mild Pain (1 - 3)  ALPRAZolam 0.25 milliGRAM(s) Oral two times a day PRN Anxiety  ALPRAZolam 0.25 milliGRAM(s) Oral once PRN before MRI  melatonin 3 milliGRAM(s) Oral at bedtime PRN Insomnia          OBJECTIVE:  Vital Signs Last 24 Hrs  T(C): 37 (11 Jan 2025 14:09), Max: 37.2 (10 Sylvester 2025 20:40)  T(F): 98.6 (11 Jan 2025 14:09), Max: 99 (10 Sylvester 2025 20:40)  HR: 61 (11 Jan 2025 14:09) (56 - 61)  BP: 107/60 (11 Jan 2025 14:09) (100/42 - 138/76)  BP(mean): --  RR: 18 (11 Jan 2025 14:09) (16 - 18)  SpO2: 96% (11 Jan 2025 14:09) (96% - 98%)    Parameters below as of 11 Jan 2025 14:09  Patient On (Oxygen Delivery Method): room air        PHYSICAL EXAM:  GENERAL: NAD  HEAD:  Atraumatic, Normocephalic  NECK: Supple, No JVD  CHEST/LUNG: Clear to auscultation bilaterally; No wheeze  HEART: Regular rate and rhythm; No murmurs, rubs, or gallops  ABDOMEN: Soft, Nontender, Nondistended; Bowel sounds present  EXTREMITIES:  No clubbing, cyanosis, or edema  PSYCH: AAOx3  SKIN: No rashes or lesions      CAPILLARY BLOOD GLUCOSE        I&O's Summary            LABS:                        14.3   5.62  )-----------( 164      ( 10 Sylvester 2025 06:29 )             41.1     01-10    142  |  110[H]  |  19[H]  ----------------------------<  119[H]  4.1   |  25  |  0.93    Ca    9.7      10 Sylvester 2025 06:29  Phos  3.5     01-10  Mg     2.4     01-10    TPro  7.0  /  Alb  3.8  /  TBili  1.0  /  DBili  x   /  AST  20  /  ALT  21  /  AlkPhos  93  01-10    PT/INR - ( 09 Jan 2025 17:15 )   PT: 11.4 sec;   INR: 0.98 ratio         PTT - ( 09 Jan 2025 17:15 )  PTT:29.8 sec      Urinalysis Basic - ( 10 Sylvester 2025 06:29 )    Color: x / Appearance: x / SG: x / pH: x  Gluc: 119 mg/dL / Ketone: x  / Bili: x / Urobili: x   Blood: x / Protein: x / Nitrite: x   Leuk Esterase: x / RBC: x / WBC x   Sq Epi: x / Non Sq Epi: x / Bacteria: x            RADIOLOGY & ADDITIONAL TESTS:

## 2025-01-11 NOTE — PHYSICAL THERAPY INITIAL EVALUATION ADULT - CRITERIA FOR SKILLED THERAPEUTIC INTERVENTIONS
DISHA however patient prefers D/C home/impairments found/functional limitations in following categories/anticipated discharge recommendation

## 2025-01-11 NOTE — PROGRESS NOTE ADULT - ASSESSMENT
84-year-old male, AAOx3, ambulates with a  walker with left eye blindness, HTN, cad s/p CABG , hld, presents reporting episode of right eye vision loss that occurred 3 days ago. Admitted for CVA vs TIA work up.    # R blurry vision, r/o stroke  # L eye blindness  # HTN  # CAD s/p CABG    - TTE without thrombus or shunt  - patient unable to tolerate brain MRI, try zanax PRN next time sent for MRI  - neurology recs appreciated, continue DAPT x 21 days  - continue home meds  - DVT ppx: Lovenox

## 2025-01-12 DIAGNOSIS — I63.9 CEREBRAL INFARCTION, UNSPECIFIED: ICD-10-CM

## 2025-01-12 LAB
ALBUMIN SERPL ELPH-MCNC: 3.2 G/DL — LOW (ref 3.5–5)
ALP SERPL-CCNC: 79 U/L — SIGNIFICANT CHANGE UP (ref 40–120)
ALT FLD-CCNC: 19 U/L DA — SIGNIFICANT CHANGE UP (ref 10–60)
ANION GAP SERPL CALC-SCNC: 7 MMOL/L — SIGNIFICANT CHANGE UP (ref 5–17)
AST SERPL-CCNC: 18 U/L — SIGNIFICANT CHANGE UP (ref 10–40)
BILIRUB SERPL-MCNC: 0.8 MG/DL — SIGNIFICANT CHANGE UP (ref 0.2–1.2)
BUN SERPL-MCNC: 18 MG/DL — SIGNIFICANT CHANGE UP (ref 7–18)
CALCIUM SERPL-MCNC: 9.1 MG/DL — SIGNIFICANT CHANGE UP (ref 8.4–10.5)
CHLORIDE SERPL-SCNC: 112 MMOL/L — HIGH (ref 96–108)
CO2 SERPL-SCNC: 23 MMOL/L — SIGNIFICANT CHANGE UP (ref 22–31)
CREAT SERPL-MCNC: 0.8 MG/DL — SIGNIFICANT CHANGE UP (ref 0.5–1.3)
EGFR: 86 ML/MIN/1.73M2 — SIGNIFICANT CHANGE UP
GLUCOSE SERPL-MCNC: 118 MG/DL — HIGH (ref 70–99)
HCT VFR BLD CALC: 35.6 % — LOW (ref 39–50)
HGB BLD-MCNC: 12.7 G/DL — LOW (ref 13–17)
MAGNESIUM SERPL-MCNC: 2.3 MG/DL — SIGNIFICANT CHANGE UP (ref 1.6–2.6)
MCHC RBC-ENTMCNC: 32.4 PG — SIGNIFICANT CHANGE UP (ref 27–34)
MCHC RBC-ENTMCNC: 35.7 G/DL — SIGNIFICANT CHANGE UP (ref 32–36)
MCV RBC AUTO: 90.8 FL — SIGNIFICANT CHANGE UP (ref 80–100)
NRBC # BLD: 0 /100 WBCS — SIGNIFICANT CHANGE UP (ref 0–0)
PHOSPHATE SERPL-MCNC: 3 MG/DL — SIGNIFICANT CHANGE UP (ref 2.5–4.5)
PLATELET # BLD AUTO: 134 K/UL — LOW (ref 150–400)
POTASSIUM SERPL-MCNC: 3.8 MMOL/L — SIGNIFICANT CHANGE UP (ref 3.5–5.3)
POTASSIUM SERPL-SCNC: 3.8 MMOL/L — SIGNIFICANT CHANGE UP (ref 3.5–5.3)
PROT SERPL-MCNC: 6 G/DL — SIGNIFICANT CHANGE UP (ref 6–8.3)
RBC # BLD: 3.92 M/UL — LOW (ref 4.2–5.8)
RBC # FLD: 12.5 % — SIGNIFICANT CHANGE UP (ref 10.3–14.5)
SODIUM SERPL-SCNC: 142 MMOL/L — SIGNIFICANT CHANGE UP (ref 135–145)
WBC # BLD: 4.56 K/UL — SIGNIFICANT CHANGE UP (ref 3.8–10.5)
WBC # FLD AUTO: 4.56 K/UL — SIGNIFICANT CHANGE UP (ref 3.8–10.5)

## 2025-01-12 PROCEDURE — 99232 SBSQ HOSP IP/OBS MODERATE 35: CPT

## 2025-01-12 PROCEDURE — 99233 SBSQ HOSP IP/OBS HIGH 50: CPT

## 2025-01-12 RX ORDER — ALPRAZOLAM 0.25 MG/1
0.25 TABLET ORAL AT BEDTIME
Refills: 0 | Status: DISCONTINUED | OUTPATIENT
Start: 2025-01-12 | End: 2025-01-13

## 2025-01-12 RX ORDER — ALPRAZOLAM 0.25 MG/1
0.25 TABLET ORAL DAILY
Refills: 0 | Status: DISCONTINUED | OUTPATIENT
Start: 2025-01-12 | End: 2025-01-13

## 2025-01-12 RX ADMIN — ALPRAZOLAM 0.25 MILLIGRAM(S): 0.25 TABLET ORAL at 23:02

## 2025-01-12 RX ADMIN — Medication 1 DROP(S): at 19:31

## 2025-01-12 RX ADMIN — Medication 25 MILLIGRAM(S): at 06:53

## 2025-01-12 RX ADMIN — Medication 1 DROP(S): at 06:37

## 2025-01-12 RX ADMIN — CLOPIDOGREL BISULFATE 75 MILLIGRAM(S): 75 TABLET, FILM COATED ORAL at 13:08

## 2025-01-12 RX ADMIN — ENOXAPARIN SODIUM 40 MILLIGRAM(S): 60 INJECTION INTRAVENOUS; SUBCUTANEOUS at 09:08

## 2025-01-12 RX ADMIN — Medication 20 MILLIGRAM(S): at 06:53

## 2025-01-12 RX ADMIN — Medication 10 MILLIGRAM(S): at 06:37

## 2025-01-12 RX ADMIN — LISINOPRIL 40 MILLIGRAM(S): 30 TABLET ORAL at 06:53

## 2025-01-12 RX ADMIN — Medication 81 MILLIGRAM(S): at 13:08

## 2025-01-12 RX ADMIN — Medication 1 DROP(S): at 13:09

## 2025-01-12 RX ADMIN — ATORVASTATIN CALCIUM 80 MILLIGRAM(S): 40 TABLET, FILM COATED ORAL at 22:54

## 2025-01-12 NOTE — PROGRESS NOTE ADULT - SUBJECTIVE AND OBJECTIVE BOX
Cardiology Progress Note  ------------------------------------------------------------------------------------------  SUBJECTIVE:   No events overnight. Denies CP, SOB or Palpitations.   Patient did not tolerate brain MRI  telemetry shows sinus bradycardia   -------------------------------------------------------------------------------------------  ROS:  CV: chest pain (-), palpitation (-), orthopnea (-), PND (-), edema (-)  PULM: SOB (-), cough (-), wheezing (-), hemoptysis (-).   CONST: fever (-), chills (-) or fatigue (-),   GI: abdominal distension (-), abdominal pain (-) , nausea/vomiting (-), hematemesis, (-), melena (-), hematochezia (-)  : dysuria (-), frequency (-), hematuria (-).   NEURO: numbness (-), weakness (-), dizziness (-)  MSK: myalgia (-), joint pain (-)   SKIN: itching (-), rash (-)  HEENT:  visual changes (-); vertigo or throat pain (-);  neck stiffness (-)   Psych: change in mood (-), anxiety (-), depression (-)     All other review of systems is negative unless indicated above.   -------------------------------------------------------------------------------------------  VS:  T(F): 97.6 (01-12), Max: 98.4 (01-12)  HR: 52 (01-12) (52 - 65)  BP: 102/52 (01-12) (99/52 - 127/59)  RR: 18 (01-12)  SpO2: 96% (01-12)  I&O's Summary    11 Jan 2025 07:01  -  12 Jan 2025 07:00  --------------------------------------------------------  IN: 0 mL / OUT: 200 mL / NET: -200 mL      ------------------------------------------------------------------------------------------  PHYSICAL EXAM:  General: No acute distress. Awake and conversant.   Eyes: inability to open his left eye ( chronic)  ENT: Hearing grossly intact. No nasal discharge.   Neck: Neck is supple. No masses or thyromegaly.   Respiratory: Respirations are non-labored. No wheezing.   Skin: Warm. No rashes or ulcers.   Psych: Alert and oriented. Cooperative, Appropriate mood and affect, Normal judgment.   CV: No lower extremity edema.   MSK: Normal ambulation. No clubbing or cyanosis.   Neuro: Sensation and CN II-XII grossly normal.  -------------------------------------------------------------------------------------------  LABS:  01-12    142  |  112[H]  |  18  ----------------------------<  118[H]  3.8   |  23  |  0.80    Ca    9.1      12 Jan 2025 07:09  Phos  3.0     01-12  Mg     2.3     01-12    TPro  6.0  /  Alb  3.2[L]  /  TBili  0.8  /  DBili  x   /  AST  18  /  ALT  19  /  AlkPhos  79  01-12    Creatinine Trend: 0.80<--, 0.93<--, 0.87<--                        12.7   4.56  )-----------( 134      ( 12 Jan 2025 07:09 )             35.6         Lipid Panel: T(F): 97.6 (01-12), Max: 98.4 (01-12)  HR: 52 (01-12) (52 - 65)  BP: 102/52 (01-12) (99/52 - 127/59)  RR: 18 (01-12)  SpO2: 96% (01-12)  Cardiac Enzymes:         -------------------------------------------------------------------------------------------  Meds:  acetaminophen     Tablet .. 650 milliGRAM(s) Oral every 6 hours PRN  ALPRAZolam 0.25 milliGRAM(s) Oral once PRN  ALPRAZolam 0.25 milliGRAM(s) Oral daily PRN  ALPRAZolam 0.25 milliGRAM(s) Oral at bedtime PRN  amLODIPine   Tablet 10 milliGRAM(s) Oral daily  aspirin enteric coated 81 milliGRAM(s) Oral daily  atorvastatin 80 milliGRAM(s) Oral at bedtime  clopidogrel Tablet 75 milliGRAM(s) Oral daily  dorzolamide 2% Ophthalmic Solution 1 Drop(s) Both EYES three times a day  enoxaparin Injectable 40 milliGRAM(s) SubCutaneous every 24 hours  isosorbide   dinitrate Tablet (ISORDIL) 20 milliGRAM(s) Oral three times a day  latanoprost 0.005% Ophthalmic Solution 1 Drop(s) Both EYES at bedtime  lisinopril 40 milliGRAM(s) Oral daily  melatonin 3 milliGRAM(s) Oral at bedtime PRN  metoprolol tartrate 25 milliGRAM(s) Oral daily  timolol 0.5% Solution 1 Drop(s) Both EYES daily    -------------------------------------------------------------------------------------------  Cardiovascular Diagnostic Testing:  -------------------------------------------------------------------------------------------  ECG:     Echo:     Stress Testing:    Cath:    Imaging:    CXR:  reviewed  -------------------------------------------------------------------------------------------  Assessment and Plan:   -------------------------------------------------------------------------------------------  Problems Assessed:   1.  2.  3.  4.    Plan:   •  •  •  •  -------------------------------------------------------------------------------------------  Billing Statement:   76/56/51/36 minutes spent on total encounter. Necessity of time spent during this encounter on this date of service was due to review of medical information in EMR, co-ordination of hospital and outpatient care, discussion with patient and communication with primary team.   -------------------------------------------------------------------------------------------  Berlin Johnson MD   of Cardiology  [facility]  [address], [address] [place], [place] [address]  Phone: [phone]  Fax: [phone]    Please check [email or web address]: [email or web address] for cardiology service schedule and contact information.  Cardiology Progress Note  ------------------------------------------------------------------------------------------  SUBJECTIVE:   No events overnight. Denies CP, SOB or Palpitations.   Patient did not tolerate brain MRI  telemetry shows sinus bradycardia   -------------------------------------------------------------------------------------------  ROS:  CV: chest pain (-), palpitation (-), orthopnea (-), PND (-), edema (-)  PULM: SOB (-), cough (-), wheezing (-), hemoptysis (-).   CONST: fever (-), chills (-) or fatigue (-),   GI: abdominal distension (-), abdominal pain (-) , nausea/vomiting (-), hematemesis, (-), melena (-), hematochezia (-)  : dysuria (-), frequency (-), hematuria (-).   NEURO: numbness (-), weakness (-), dizziness (-)  MSK: myalgia (-), joint pain (-)   SKIN: itching (-), rash (-)  HEENT:  visual changes (-); vertigo or throat pain (-);  neck stiffness (-)   Psych: change in mood (-), anxiety (-), depression (-)     All other review of systems is negative unless indicated above.   -------------------------------------------------------------------------------------------  VS:  T(F): 97.6 (01-12), Max: 98.4 (01-12)  HR: 52 (01-12) (52 - 65)  BP: 102/52 (01-12) (99/52 - 127/59)  RR: 18 (01-12)  SpO2: 96% (01-12)  I&O's Summary    11 Jan 2025 07:01  -  12 Jan 2025 07:00  --------------------------------------------------------  IN: 0 mL / OUT: 200 mL / NET: -200 mL      ------------------------------------------------------------------------------------------  PHYSICAL EXAM:  General: No acute distress. Awake and conversant.   Eyes: inability to open his left eye ( chronic)  ENT: Hearing grossly intact. No nasal discharge.   Neck: Neck is supple. No masses or thyromegaly.   Respiratory: Respirations are non-labored. No wheezing.   Skin: Warm. No rashes or ulcers.   Psych: Alert and oriented. Cooperative, Appropriate mood and affect, Normal judgment.   CV: No lower extremity edema.   MSK: Normal ambulation. No clubbing or cyanosis.   Neuro: Sensation and CN II-XII grossly normal.  -------------------------------------------------------------------------------------------  LABS:  01-12    142  |  112[H]  |  18  ----------------------------<  118[H]  3.8   |  23  |  0.80    Ca    9.1      12 Jan 2025 07:09  Phos  3.0     01-12  Mg     2.3     01-12    TPro  6.0  /  Alb  3.2[L]  /  TBili  0.8  /  DBili  x   /  AST  18  /  ALT  19  /  AlkPhos  79  01-12    Creatinine Trend: 0.80<--, 0.93<--, 0.87<--                        12.7   4.56  )-----------( 134      ( 12 Jan 2025 07:09 )             35.6         Lipid Panel: T(F): 97.6 (01-12), Max: 98.4 (01-12)  HR: 52 (01-12) (52 - 65)  BP: 102/52 (01-12) (99/52 - 127/59)  RR: 18 (01-12)  SpO2: 96% (01-12)  Cardiac Enzymes:         -------------------------------------------------------------------------------------------  Meds:  acetaminophen     Tablet .. 650 milliGRAM(s) Oral every 6 hours PRN  ALPRAZolam 0.25 milliGRAM(s) Oral once PRN  ALPRAZolam 0.25 milliGRAM(s) Oral daily PRN  ALPRAZolam 0.25 milliGRAM(s) Oral at bedtime PRN  amLODIPine   Tablet 10 milliGRAM(s) Oral daily  aspirin enteric coated 81 milliGRAM(s) Oral daily  atorvastatin 80 milliGRAM(s) Oral at bedtime  clopidogrel Tablet 75 milliGRAM(s) Oral daily  dorzolamide 2% Ophthalmic Solution 1 Drop(s) Both EYES three times a day  enoxaparin Injectable 40 milliGRAM(s) SubCutaneous every 24 hours  isosorbide   dinitrate Tablet (ISORDIL) 20 milliGRAM(s) Oral three times a day  latanoprost 0.005% Ophthalmic Solution 1 Drop(s) Both EYES at bedtime  lisinopril 40 milliGRAM(s) Oral daily  melatonin 3 milliGRAM(s) Oral at bedtime PRN  metoprolol tartrate 25 milliGRAM(s) Oral daily  timolol 0.5% Solution 1 Drop(s) Both EYES daily    -------------------------------------------------------------------------------------------  Cardiovascular Diagnostic Testing:  -------------------------------------------------------------------------------------------  ECG:     Echo:   < from: TTE W or WO Ultrasound Enhancing Agent (01.10.25 @ 14:07) >  ONCLUSIONS:      1. Left ventricular cavity is normal in size. Left ventricular wall thickness is mildly increased. Septal motion is abnormal consistent with previous cardiac surgery. Left ventricularsystolic function is normal with an ejection fraction visually estimated at 55 to 60 %.   2. The left ventricular diastolic function is indeterminate.   3. The right ventricle is not well visualized.   4. Grossly normal RV size and systolic function.   5. 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   6. Trileaflet aortic valve. Fibrocalcific aortic valve sclerosis without stenosis.   7. Mild to moderate aortic regurgitation.   8. Structurally normal mitral valve with normal leaflet excursion.   9. Trace mitral regurgitation.  10. Mild pulmonic regurgitation.  11. Agitated saline injection was negative for intracardiac shunt.  12. No echocardiographic evidence of pulmonary hypertension.  13. No pericardial effusion seen.  14. No prior echocardiogram is available for comparison.    < end of copied text >    Stress Testing:    Cath:    Imaging:    CXR:  reviewed  -------------------------------------------------------------------------------------------  Assessment and Plan:   -------------------------------------------------------------------------------------------  Problems Assessed:   1. Possible acute CVA/TIA- neurological workup is ongoing   2. CAD s/p CABG- no anginal symptoms.    3. HTN- stable.   4. HLD- stable.     Plan:   • continue DAPTx21 days as per neurology , and atorvastatin for possible CVA/TIA.   • neurology workup is ongoing, MRI brain pending- patient attempted but did not tolerate. No new neurological symptoms in the hospital.   • continue amlodipine, metoprolol and lisinopril at current dose.   • Echo without any cardiac source of emboli. No thrombus noted.   • Telemetry shows sinus rhythm.   • Patient should follow up with his cardiologist for Event monitor +/- ILR for monitoring for atrial fibrillation given TIA.   -------------------------------------------------------------------------------------------  Billing Statement:   36 minutes spent on total encounter. Necessity of time spent during this encounter on this date of service was due to review of medical information in EMR, co-ordination of hospital and outpatient care, discussion with patient and communication with primary team.   -------------------------------------------------------------------------------------------      Berlin Johnson MD   of Cardiology  [facility]  [address], [address]  [place] [id number]  Phone: [phone]  Fax: [phone]    Please check [email or web address]: [email or web address] for cardiology service schedule and contact information.

## 2025-01-12 NOTE — PROGRESS NOTE ADULT - ATTENDING COMMENTS
Patient seen at bedside, states his R vision is unchanged from yesterday.  On exam, patient is AOx3, NAD, cardiopulmonary exams unremarkable, abdomen soft, NT/ND, extremities without edema.  Labs reviewed, CBC, BMP, LFT without significant change.    Assessment and plan:   84-year-old male, AAOx3, ambulates with a  walker with left eye blindness, HTN, cad s/p CABG , hld, presents reporting episode of right eye vision loss that occurred 3 days ago. Admitted for CVA vs TIA work up.    # R blurry vision, r/o stroke  # L eye blindness  # HTN  # CAD s/p CABG    - TTE without thrombus or shunt  - patient unable to tolerate brain MRI, try xanax PRN next time sent for MRI  - neurology recs appreciated, continue DAPT x 21 days  - continue home meds  - DVT ppx: Lovenox

## 2025-01-12 NOTE — PROGRESS NOTE ADULT - ASSESSMENT
pending stroke w/p, brian MRI  brain and orbits to eval for retinal artery occlusion, trasenit left vision loss Right vision changes in patient with prior left vision loss, chronic, concerning for retinal artery occlusion (PANDYA) on the right side.    Will recommend to cw tele  DAC and statin for secondary stroke prevention  MRI brain to eval for retinal artery occlusion (PANDYA)   dvt ppx    time spent 40min

## 2025-01-12 NOTE — PROGRESS NOTE ADULT - PROBLEM SELECTOR PLAN 1
P/w with right eye vision loss  Vitals: temp 98, HR 64, /73  cth/angio/neck: neg   Code stroke NIHSS    0 called in ED  admitted fot cva work up  EKG showing NSR   lipid panel wnl  TTE; EF 55 - 60%    xanax 0.25 prn at bedtime, and daytime    -c/w ASA 81mg QD, atorvastatin 80 mg QD  -c/w plavix  -f/u a1c  -pending mri brain w/wo contrast. mri orbits w/wo contrast (pt unable to tolerate mri 1/11 due to claustrophobia, plan to do MRI again with xanax 0.25)  - Neurology Dr. King/Sofía following P/w with right eye vision loss  Vitals: temp 98, HR 64, /73  cth/angio/neck: neg   Code stroke NIHSS    0 called in ED  admitted fot cva work up  EKG showing NSR   lipid panel wnl  TTE; EF 55 - 60%    xanax 0.25 prn at bedtime, and daytime    -c/w ASA 81mg QD, atorvastatin 80 mg QD  -c/w plavix  -f/u a1c  -pending mri brain w/wo contrast. mri orbits w/wo contrast eval for retinal artery occlusion, trasenit left vision loss   (pt unable to tolerate mri 1/11 due to claustrophobia, plan to do MRI again with xanax 0.25)  - Neurology Dr. King/Sofía following P/w with right eye vision loss  Vitals: temp 98, HR 64, /73  cth/angio/neck: neg   Code stroke NIHSS    0 called in ED  admitted fot cva work up  EKG showing NSR   lipid panel wnl  TTE; EF 55 - 60%    xanax 0.25 prn at bedtime, and daytime    -c/w ASA 81mg QD, atorvastatin 80 mg QD  -c/w plavix - DAPT for total 21 days  -f/u a1c  -pending mri brain w/wo contrast. mri orbits w/wo contrast eval for retinal artery occlusion, trasenit left vision loss   (pt unable to tolerate mri 1/11 due to claustrophobia, plan to do MRI again with xanax 0.25)  - Neurology Dr. King/Sofía following

## 2025-01-12 NOTE — PROGRESS NOTE ADULT - SUBJECTIVE AND OBJECTIVE BOX
***TEMPLATE ONLY***    Neurology Follow up note    Name  ERMELINDA ALY    HPI:  84-year-old male, AAOx3, ambulates with a  walker with left eye blindness, HTN, cad s/p CABG , hld, presents reporting episode of right eye vision loss that occurred 3 days ago.  Patient reports upon waking having a period of inability to see upon waking up in his right eye for about 15 minutes on and off.  Reports the following day he saw his primary care doctor who then scheduled him to see ophthalmologist.  Reports yesterday being seen by ophthalmology and being told today to come to the ED out of concern for possible CVA versus TIA.   His vision now has normalized. He reports this has never happened to him before. Denies any fever, chills, n/v, cp, sob, abdominal pain, n/v, or diarrhea.     ED course:  Vitals: temp 98, HR 64, /73  cth/angio/neck: neg  (10 Sylvester 2025 00:43)      Interval History -        Subjective:    Review of Systems:  Constitutional:        Eyes, Ears, Mouth, Throat:   Respiratory:                            Cardiovascular:   Gastrointestinal:                                     Genitourinary:   Musculoskeletal:                                    Dermatologic:   Neurological: as per above                                                                 Psychiatric:   Endocrine:              Hematologic/Lymphatic:     MEDICATIONS  (STANDING):  amLODIPine   Tablet 10 milliGRAM(s) Oral daily  aspirin enteric coated 81 milliGRAM(s) Oral daily  atorvastatin 80 milliGRAM(s) Oral at bedtime  clopidogrel Tablet 75 milliGRAM(s) Oral daily  dorzolamide 2% Ophthalmic Solution 1 Drop(s) Both EYES three times a day  enoxaparin Injectable 40 milliGRAM(s) SubCutaneous every 24 hours  isosorbide   dinitrate Tablet (ISORDIL) 20 milliGRAM(s) Oral three times a day  latanoprost 0.005% Ophthalmic Solution 1 Drop(s) Both EYES at bedtime  lisinopril 40 milliGRAM(s) Oral daily  metoprolol tartrate 25 milliGRAM(s) Oral daily  timolol 0.5% Solution 1 Drop(s) Both EYES daily    MEDICATIONS  (PRN):  acetaminophen     Tablet .. 650 milliGRAM(s) Oral every 6 hours PRN Temp greater or equal to 38C (100.4F), Mild Pain (1 - 3)  ALPRAZolam 0.25 milliGRAM(s) Oral two times a day PRN Anxiety  ALPRAZolam 0.25 milliGRAM(s) Oral once PRN before MRI  melatonin 3 milliGRAM(s) Oral at bedtime PRN Insomnia      Allergies    No Known Allergies    Intolerances        Objective:   Vital Signs Last 24 Hrs  T(C): 36.9 (12 Jan 2025 05:49), Max: 37 (11 Jan 2025 14:09)  T(F): 98.4 (12 Jan 2025 05:49), Max: 98.6 (11 Jan 2025 14:09)  HR: 60 (12 Jan 2025 05:49) (52 - 65)  BP: 127/59 (12 Jan 2025 05:49) (100/42 - 138/76)  BP(mean): --  RR: 18 (12 Jan 2025 05:49) (18 - 18)  SpO2: 96% (12 Jan 2025 05:49) (96% - 98%)    Parameters below as of 12 Jan 2025 05:49  Patient On (Oxygen Delivery Method): room air        General Exam:   General appearance: No acute distress                 Cardiovascular: Pedal dorsalis pulses intact bilaterally    Neurological Exam:  Mental Status: Orientated to self, date and place.  Attention intact.  No dysarthria, aphasia or neglect.  Knowledge intact.  Registration intact.  Short and long term memory grossly intact.      Cranial Nerves: CN I - not tested.  PERRL, EOMI, VFF, no nystagmus or diplopia.  No APD.  Fundi not visualized bilaterally.  CN V1-3 intact to light touch and pinprick.  No facial asymmetry.  Hearing intact to finger rub bilaterally.  Tongue, uvula and palate midline.  Sternocleidomastoid and Trapezius intact bilaterally.    Motor:   Tone: normal.                  Strength: intact throughout  Pronator drift: none                 Dysmeria: None to finger-nose-finger or heel-shin-heel  No truncal ataxia.    Tremor: No resting, postural or action tremor.  No myoclonus.    Sensation: intact to light touch, pinprick, vibration and proprioception    Deep Tendon Reflexes: 1+ bilateral biceps, triceps, brachioradialis, knee and ankle  Toes flexor bilaterally    Gait: normal and stable.      Other:    01-12    142  |  112[H]  |  18  ----------------------------<  118[H]  3.8   |  23  |  0.80    Ca    9.1      12 Jan 2025 07:09  Phos  3.0     01-12  Mg     2.3     01-12    TPro  6.0  /  Alb  3.2[L]  /  TBili  0.8  /  DBili  x   /  AST  18  /  ALT  19  /  AlkPhos  79  01-12 01-12    142  |  112[H]  |  18  ----------------------------<  118[H]  3.8   |  23  |  0.80    Ca    9.1      12 Jan 2025 07:09  Phos  3.0     01-12  Mg     2.3     01-12    TPro  6.0  /  Alb  3.2[L]  /  TBili  0.8  /  DBili  x   /  AST  18  /  ALT  19  /  AlkPhos  79  01-12    LIVER FUNCTIONS - ( 12 Jan 2025 07:09 )  Alb: 3.2 g/dL / Pro: 6.0 g/dL / ALK PHOS: 79 U/L / ALT: 19 U/L DA / AST: 18 U/L / GGT: x             Radiology    EKG:  tele:  TTE:  EEG:         < from: TTE W or WO Ultrasound Enhancing Agent (01.10.25 @ 14:07) >  CONCLUSIONS:      1. Left ventricular cavity is normal in size. Left ventricular wall thickness is mildly increased. Septal motion is abnormal consistent with previous cardiac surgery. Left ventricularsystolic function is normal with an ejection fraction visually estimated at 55 to 60 %.   2. The left ventricular diastolic function is indeterminate.   3. The right ventricle is not well visualized.   4. Grossly normal RV size and systolic function.   5. 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   6. Trileaflet aortic valve. Fibrocalcific aortic valve sclerosis without stenosis.   7. Mild to moderate aortic regurgitation.   8. Structurally normal mitral valve with normal leaflet excursion.   9. Trace mitral regurgitation.  10. Mild pulmonic regurgitation.  11. Agitated saline injection was negative for intracardiac shunt.  12. No echocardiographic evidence of pulmonary hypertension.  13. No pericardial effusion seen.  14. No prior echocardiogram is available for comparison.    < end of copied text >           Neurology Follow up note    Name  ERMELINDA ALY    Subjective:  right eye vision improved but not at baseline per pt  no new events    Review of Systems:  Constitutional:      no fever  Respiratory:    no cough                          MEDICATIONS  (STANDING):  amLODIPine   Tablet 10 milliGRAM(s) Oral daily  aspirin enteric coated 81 milliGRAM(s) Oral daily  atorvastatin 80 milliGRAM(s) Oral at bedtime  clopidogrel Tablet 75 milliGRAM(s) Oral daily  dorzolamide 2% Ophthalmic Solution 1 Drop(s) Both EYES three times a day  enoxaparin Injectable 40 milliGRAM(s) SubCutaneous every 24 hours  isosorbide   dinitrate Tablet (ISORDIL) 20 milliGRAM(s) Oral three times a day  latanoprost 0.005% Ophthalmic Solution 1 Drop(s) Both EYES at bedtime  lisinopril 40 milliGRAM(s) Oral daily  metoprolol tartrate 25 milliGRAM(s) Oral daily  timolol 0.5% Solution 1 Drop(s) Both EYES daily    MEDICATIONS  (PRN):  acetaminophen     Tablet .. 650 milliGRAM(s) Oral every 6 hours PRN Temp greater or equal to 38C (100.4F), Mild Pain (1 - 3)  ALPRAZolam 0.25 milliGRAM(s) Oral two times a day PRN Anxiety  ALPRAZolam 0.25 milliGRAM(s) Oral once PRN before MRI  melatonin 3 milliGRAM(s) Oral at bedtime PRN Insomnia      Allergies    No Known Allergies    Intolerances        Objective:   Vital Signs Last 24 Hrs  T(C): 36.9 (12 Jan 2025 05:49), Max: 37 (11 Jan 2025 14:09)  T(F): 98.4 (12 Jan 2025 05:49), Max: 98.6 (11 Jan 2025 14:09)  HR: 60 (12 Jan 2025 05:49) (52 - 65)  BP: 127/59 (12 Jan 2025 05:49) (100/42 - 138/76)  BP(mean): --  RR: 18 (12 Jan 2025 05:49) (18 - 18)  SpO2: 96% (12 Jan 2025 05:49) (96% - 98%)    Parameters below as of 12 Jan 2025 05:49  Patient On (Oxygen Delivery Method): room air        General Exam:   General appearance: No acute distress                 Cardiovascular: Pedal dorsalis pulses intact bilaterally    Neurological Exam:  Mental Status: Orientated to self, date and place.  Attention intact.  No dysarthria, aphasia or neglect.      Cranial Nerves: CN I - not tested.  left eye opacified and blind at baseline.  right eye reactive to light. EOMI, VFF on right.  no nystagmus or diplopia.  No APD.  Fundi not visualized bilaterally.  CN V1-3 intact to light touch.  No facial asymmetry.      Motor:   Tone: normal.                  Strength: intact throughout  Pronator drift: none                 Dysmeria: None to finger-nose-finger or heel-shin-heel  No truncal ataxia.    Tremor: No resting, postural or action tremor.  No myoclonus.    Sensation: intact to light touch    Gait: walks with walker at baseline    Other:  NIHSS 0, mRS 3    01-12    142  |  112[H]  |  18  ----------------------------<  118[H]  3.8   |  23  |  0.80    Ca    9.1      12 Jan 2025 07:09  Phos  3.0     01-12  Mg     2.3     01-12    TPro  6.0  /  Alb  3.2[L]  /  TBili  0.8  /  DBili  x   /  AST  18  /  ALT  19  /  AlkPhos  79  01-12 01-12    142  |  112[H]  |  18  ----------------------------<  118[H]  3.8   |  23  |  0.80    Ca    9.1      12 Jan 2025 07:09  Phos  3.0     01-12  Mg     2.3     01-12    TPro  6.0  /  Alb  3.2[L]  /  TBili  0.8  /  DBili  x   /  AST  18  /  ALT  19  /  AlkPhos  79  01-12    LIVER FUNCTIONS - ( 12 Jan 2025 07:09 )  Alb: 3.2 g/dL / Pro: 6.0 g/dL / ALK PHOS: 79 U/L / ALT: 19 U/L DA / AST: 18 U/L / GGT: x             Radiology       < from: TTE W or WO Ultrasound Enhancing Agent (01.10.25 @ 14:07) >  CONCLUSIONS:      1. Left ventricular cavity is normal in size. Left ventricular wall thickness is mildly increased. Septal motion is abnormal consistent with previous cardiac surgery. Left ventricularsystolic function is normal with an ejection fraction visually estimated at 55 to 60 %.   2. The left ventricular diastolic function is indeterminate.   3. The right ventricle is not well visualized.   4. Grossly normal RV size and systolic function.   5. 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   6. Trileaflet aortic valve. Fibrocalcific aortic valve sclerosis without stenosis.   7. Mild to moderate aortic regurgitation.   8. Structurally normal mitral valve with normal leaflet excursion.   9. Trace mitral regurgitation.  10. Mild pulmonic regurgitation.  11. Agitated saline injection was negative for intracardiac shunt.  12. No echocardiographic evidence of pulmonary hypertension.  13. No pericardial effusion seen.  14. No prior echocardiogram is available for comparison.    < end of copied text >

## 2025-01-12 NOTE — PROGRESS NOTE ADULT - SUBJECTIVE AND OBJECTIVE BOX
PGY-1 Progress Note discussed with attending      PLEASE CONTACT ON CALL TEAM:  - On Call Team (Please refer to Linda) FROM 5:00 PM - 8:30PM  - Nightfloat Team FROM 8:30 -7:30 AM    INTERVAL HPI/OVERNIGHT EVENTS: No acute events overnight.  Patient examined at bedside this AM.  Denies any new weakness, chest pain, vision changes. Requesting ocusoft wipes prior to application of eye drops.       REVIEW OF SYSTEMS:  CONSTITUTIONAL: No fever, weight loss, or fatigue  RESPIRATORY: No cough, wheezing, chills or hemoptysis; No shortness of breath  CARDIOVASCULAR: No chest pain, palpitations, dizziness, or leg swelling  GASTROINTESTINAL: No abdominal pain. No nausea, vomiting, or hematemesis; No diarrhea or constipation. No melena or hematochezia.  GENITOURINARY: No dysuria or hematuria, urinary frequency  NEUROLOGICAL: No headaches, memory loss, loss of strength, numbness, or tremors  SKIN: No itching, burning, rashes, or lesions     MEDICATIONS  (STANDING):  amLODIPine   Tablet 10 milliGRAM(s) Oral daily  aspirin enteric coated 81 milliGRAM(s) Oral daily  atorvastatin 80 milliGRAM(s) Oral at bedtime  clopidogrel Tablet 75 milliGRAM(s) Oral daily  dorzolamide 2% Ophthalmic Solution 1 Drop(s) Both EYES three times a day  enoxaparin Injectable 40 milliGRAM(s) SubCutaneous every 24 hours  isosorbide   dinitrate Tablet (ISORDIL) 20 milliGRAM(s) Oral three times a day  latanoprost 0.005% Ophthalmic Solution 1 Drop(s) Both EYES at bedtime  lisinopril 40 milliGRAM(s) Oral daily  metoprolol tartrate 25 milliGRAM(s) Oral daily  timolol 0.5% Solution 1 Drop(s) Both EYES daily    MEDICATIONS  (PRN):  acetaminophen     Tablet .. 650 milliGRAM(s) Oral every 6 hours PRN Temp greater or equal to 38C (100.4F), Mild Pain (1 - 3)  ALPRAZolam 0.25 milliGRAM(s) Oral once PRN before MRI  ALPRAZolam 0.25 milliGRAM(s) Oral daily PRN Anxiety  ALPRAZolam 0.25 milliGRAM(s) Oral at bedtime PRN Anxiety  melatonin 3 milliGRAM(s) Oral at bedtime PRN Insomnia      Vital Signs Last 24 Hrs  T(C): 36.3 (12 Jan 2025 10:09), Max: 37 (11 Jan 2025 14:09)  T(F): 97.3 (12 Jan 2025 10:09), Max: 98.6 (11 Jan 2025 14:09)  HR: 53 (12 Jan 2025 10:09) (52 - 65)  BP: 99/52 (12 Jan 2025 10:09) (99/52 - 127/59)  BP(mean): 66 (12 Jan 2025 10:09) (66 - 66)  RR: 18 (12 Jan 2025 10:09) (18 - 18)  SpO2: 94% (12 Jan 2025 10:09) (94% - 97%)    Parameters below as of 12 Jan 2025 10:09  Patient On (Oxygen Delivery Method): room air        PHYSICAL EXAMINATION:  GENERAL: NAD  HEAD:  Atraumatic, Normocephalic  EYES: Atraumatic, normocephalic, non-icteric, Lt eye chronic blindness, closed and unable to assess, Rt eye intact ECOM, pupil 5mm in size, reactive to light, intact vision field, oval shape  NECK: Supple, No JVD, Normal thyroid  CHEST/LUNG: Clear to auscultation. Clear to percussion bilaterally; No rales, rhonchi, wheezing, or rubs  HEART: Regular rate and rhythm; No murmurs, rubs, or gallops  ABDOMEN: Soft, Nontender, Nondistended; Bowel sounds present, no pain or masses on palpation  NERVOUS SYSTEM:  Alert & Oriented X3  EXTREMITIES:  2+ Peripheral Pulses, No clubbing, cyanosis, or edema  SKIN: warm dry                          12.7   4.56  )-----------( 134      ( 12 Jan 2025 07:09 )             35.6     01-12    142  |  112[H]  |  18  ----------------------------<  118[H]  3.8   |  23  |  0.80    Ca    9.1      12 Jan 2025 07:09  Phos  3.0     01-12  Mg     2.3     01-12    TPro  6.0  /  Alb  3.2[L]  /  TBili  0.8  /  DBili  x   /  AST  18  /  ALT  19  /  AlkPhos  79  01-12    LIVER FUNCTIONS - ( 12 Jan 2025 07:09 )  Alb: 3.2 g/dL / Pro: 6.0 g/dL / ALK PHOS: 79 U/L / ALT: 19 U/L DA / AST: 18 U/L / GGT: x                   I&O's Summary    11 Jan 2025 07:01  -  12 Jan 2025 07:00  --------------------------------------------------------  IN: 0 mL / OUT: 200 mL / NET: -200 mL            CAPILLARY BLOOD GLUCOSE      RADIOLOGY & ADDITIONAL TESTS:

## 2025-01-13 LAB
ALBUMIN SERPL ELPH-MCNC: 3.1 G/DL — LOW (ref 3.5–5)
ALP SERPL-CCNC: 76 U/L — SIGNIFICANT CHANGE UP (ref 40–120)
ALT FLD-CCNC: 18 U/L DA — SIGNIFICANT CHANGE UP (ref 10–60)
ANION GAP SERPL CALC-SCNC: 8 MMOL/L — SIGNIFICANT CHANGE UP (ref 5–17)
AST SERPL-CCNC: 21 U/L — SIGNIFICANT CHANGE UP (ref 10–40)
BILIRUB SERPL-MCNC: 0.7 MG/DL — SIGNIFICANT CHANGE UP (ref 0.2–1.2)
BUN SERPL-MCNC: 17 MG/DL — SIGNIFICANT CHANGE UP (ref 7–18)
CALCIUM SERPL-MCNC: 9 MG/DL — SIGNIFICANT CHANGE UP (ref 8.4–10.5)
CHLORIDE SERPL-SCNC: 114 MMOL/L — HIGH (ref 96–108)
CO2 SERPL-SCNC: 20 MMOL/L — LOW (ref 22–31)
CREAT SERPL-MCNC: 0.73 MG/DL — SIGNIFICANT CHANGE UP (ref 0.5–1.3)
EGFR: 89 ML/MIN/1.73M2 — SIGNIFICANT CHANGE UP
GLUCOSE SERPL-MCNC: 107 MG/DL — HIGH (ref 70–99)
HCT VFR BLD CALC: 35.9 % — LOW (ref 39–50)
HGB BLD-MCNC: 12.2 G/DL — LOW (ref 13–17)
MAGNESIUM SERPL-MCNC: 2.4 MG/DL — SIGNIFICANT CHANGE UP (ref 1.6–2.6)
MCHC RBC-ENTMCNC: 31.4 PG — SIGNIFICANT CHANGE UP (ref 27–34)
MCHC RBC-ENTMCNC: 34 G/DL — SIGNIFICANT CHANGE UP (ref 32–36)
MCV RBC AUTO: 92.3 FL — SIGNIFICANT CHANGE UP (ref 80–100)
NRBC # BLD: 0 /100 WBCS — SIGNIFICANT CHANGE UP (ref 0–0)
PHOSPHATE SERPL-MCNC: 3 MG/DL — SIGNIFICANT CHANGE UP (ref 2.5–4.5)
PLATELET # BLD AUTO: 134 K/UL — LOW (ref 150–400)
POTASSIUM SERPL-MCNC: 3.9 MMOL/L — SIGNIFICANT CHANGE UP (ref 3.5–5.3)
POTASSIUM SERPL-SCNC: 3.9 MMOL/L — SIGNIFICANT CHANGE UP (ref 3.5–5.3)
PROT SERPL-MCNC: 5.9 G/DL — LOW (ref 6–8.3)
RBC # BLD: 3.89 M/UL — LOW (ref 4.2–5.8)
RBC # FLD: 12.9 % — SIGNIFICANT CHANGE UP (ref 10.3–14.5)
SODIUM SERPL-SCNC: 142 MMOL/L — SIGNIFICANT CHANGE UP (ref 135–145)
WBC # BLD: 4.41 K/UL — SIGNIFICANT CHANGE UP (ref 3.8–10.5)
WBC # FLD AUTO: 4.41 K/UL — SIGNIFICANT CHANGE UP (ref 3.8–10.5)

## 2025-01-13 PROCEDURE — 70553 MRI BRAIN STEM W/O & W/DYE: CPT | Mod: 26

## 2025-01-13 PROCEDURE — 99233 SBSQ HOSP IP/OBS HIGH 50: CPT

## 2025-01-13 PROCEDURE — 70543 MRI ORBT/FAC/NCK W/O &W/DYE: CPT | Mod: 26

## 2025-01-13 PROCEDURE — 99232 SBSQ HOSP IP/OBS MODERATE 35: CPT

## 2025-01-13 RX ORDER — LORAZEPAM 1 MG/1
1 TABLET ORAL ONCE
Refills: 0 | Status: DISCONTINUED | OUTPATIENT
Start: 2025-01-13 | End: 2025-01-13

## 2025-01-13 RX ADMIN — Medication 10 MILLIGRAM(S): at 06:30

## 2025-01-13 RX ADMIN — LISINOPRIL 40 MILLIGRAM(S): 30 TABLET ORAL at 06:30

## 2025-01-13 RX ADMIN — Medication 20 MILLIGRAM(S): at 12:13

## 2025-01-13 RX ADMIN — Medication 3 MILLIGRAM(S): at 22:01

## 2025-01-13 RX ADMIN — Medication 1 DROP(S): at 14:27

## 2025-01-13 RX ADMIN — ENOXAPARIN SODIUM 40 MILLIGRAM(S): 60 INJECTION INTRAVENOUS; SUBCUTANEOUS at 08:03

## 2025-01-13 RX ADMIN — Medication 25 MILLIGRAM(S): at 06:30

## 2025-01-13 RX ADMIN — LORAZEPAM 1 MILLIGRAM(S): 1 TABLET ORAL at 14:23

## 2025-01-13 RX ADMIN — Medication 1 DROP(S): at 22:01

## 2025-01-13 RX ADMIN — Medication 1 DROP(S): at 06:31

## 2025-01-13 RX ADMIN — Medication 20 MILLIGRAM(S): at 06:30

## 2025-01-13 RX ADMIN — LATANOPROST 1 DROP(S): 50 SOLUTION OPHTHALMIC at 22:01

## 2025-01-13 RX ADMIN — ATORVASTATIN CALCIUM 80 MILLIGRAM(S): 40 TABLET, FILM COATED ORAL at 22:01

## 2025-01-13 RX ADMIN — Medication 20 MILLIGRAM(S): at 18:11

## 2025-01-13 RX ADMIN — Medication 81 MILLIGRAM(S): at 12:12

## 2025-01-13 RX ADMIN — CLOPIDOGREL BISULFATE 75 MILLIGRAM(S): 75 TABLET, FILM COATED ORAL at 12:12

## 2025-01-13 RX ADMIN — Medication 1 DROP(S): at 12:12

## 2025-01-13 NOTE — PROGRESS NOTE ADULT - NS ATTEND AMEND GEN_ALL_CORE FT
I spent a total of 50 minutes evaluating and discussing the patient, and I agree with the above history, examination, assessment, and plan. 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
agree with plan patient nees an ILR

## 2025-01-13 NOTE — PROGRESS NOTE ADULT - PROBLEM SELECTOR PLAN 1
-Possible acute CVA/TIA- neurological workup is ongoing   -continue DAPTx21 days as per neurology , and atorvastatin for possible CVA/TIA.   -neurology workup is ongoing, MRI brain pending- patient attempted but did not tolerate. No new neurological symptoms in the hospital.  - Echo without any cardiac source of emboli. No thrombus noted.   -Telemetry shows sinus rhythm.  -EP eval for possible ILR placement

## 2025-01-13 NOTE — PROGRESS NOTE ADULT - ASSESSMENT
Assessment and Plan:    Assessment:  84-year-old male, AAOx3, ambulates with a  walker with left eye blindness, HTN, cad s/p CABG , hld, presented to [facility] for eval of Transient R eye vision loss X 15-30minutes that occurred 3days prior to presentation, Now R eye vision at his baseline. Pt admitted for further work up and monitoring.    Impression: Transient R eye vision loss (Non functional L eye), 3 days prior to presentation to ED, C/F TIA w/ CRAO.      Recommendations :       1. TIA/Stroke workup  - MRI Brain w/wo Gadolinium & MRI Orbits w/wo Gadolinium approved to evaluate for acute intracranial or orbital abnormalities (if there are no contraindications)  - Transthoracic Echocardiogram : Results as above, Cards following.   - Telemetry monitoring while inpatient      2. Stroke prevention  - Q4H Neurochecks & Vital signs  - Patient has passed a bedside swallow evaluation  - C/W Aspirin 81mg PO Daily and Clopidogrel 75mg PO Daily x 21 days and then ASA 81mg po daily long term for secondary stroke prevention.   - Okay to change Atorvastatin 80mg to home Rosuvastatin 5mg PO QHS (LDL < 70 mg/dL at this dose)  - Maintain home antihypertensive medications, No role for permissive hypertension at this time - Treat BP if over 140/90 (goal /80)  - Diabetes management as per primary team  - PT/OT as needed  - DVT ppx: SCDs, Enoxaparin  - Stroke Labs:  HbA1C: 6.0  LDL: 60 ( On HIS as above).       To be discussed with Neuro attending Dr. King. Assessment and Plan:    Assessment:  84-year-old male, AAOx3, ambulates with a  walker with left eye blindness, HTN, cad s/p CABG , hld, presented to [facility] for eval of Transient R eye vision loss X 15-30minutes that occurred 3days prior to presentation, Now R eye vision at his baseline. Pt admitted for further work up and monitoring.    Impression: Transient R eye vision loss (Non functional L eye), 3 days prior to presentation to ED, C/F TIA w/ CRAO.      Recommendations :       1. TIA/Stroke workup  - MRI Brain w/wo Gadolinium & MRI Orbits w/wo Gadolinium approved to evaluate for acute intracranial or orbital abnormalities (if there are no contraindications)  - Transthoracic Echocardiogram : Results as above, Cards following.   - Telemetry monitoring while inpatient      2. Stroke prevention  - Q4H Neurochecks & Vital signs  - Patient has passed a bedside swallow evaluation  - C/W Aspirin 81mg PO Daily and Clopidogrel 75mg PO Daily x 21 days and then ASA 81mg po daily long term for secondary stroke prevention.   - Okay to change Atorvastatin 80mg to home Rosuvastatin 5mg PO QHS (LDL < 70 mg/dL at this dose)  - Maintain home antihypertensive medications, No role for permissive hypertension at this time - Treat BP if over 140/90 (goal /80)  - Diabetes management as per primary team  - PT/OT as needed  - DVT ppx: SCDs, Enoxaparin  - Stroke Labs:  HbA1C: 6.0  LDL: 60 ( On HIS as above).       Discussed with Neuro attending Dr. King during rounds.  Assessment and Plan:    Assessment:  84-year-old male, AAOx3, ambulates with a  walker with left eye blindness, HTN, cad s/p CABG , hld, presented to [facility] for eval of Transient R eye vision loss X 15-30minutes that occurred 3days prior to presentation, Now R eye vision at his baseline. Pt admitted for further work up and monitoring.    Impression: Transient R eye vision loss (Non functional L eye), 3 days prior to presentation to ED, likely secondary to TIA w/ CRAO.      Recommendations :       1. TIA/Stroke workup  - MRI Brain w/wo Gadolinium & MRI Orbits w/wo Gadolinium approved to evaluate for acute intracranial or orbital abnormalities (if there are no contraindications)  - Transthoracic Echocardiogram : Results as above, Cards following.   - Telemetry monitoring while inpatient      2. Stroke prevention  - Q4H Neurochecks & Vital signs  - Patient has passed a bedside swallow evaluation  - C/W Aspirin 81mg PO Daily and Clopidogrel 75mg PO Daily x 21 days and then ASA 81mg po daily long term for secondary stroke prevention.   - Change Atorvastatin 80mg to home Rosuvastatin 5mg PO QHS (LDL < 70 mg/dL at this dose)  - Maintain home antihypertensive medications, No role for permissive hypertension at this time - Treat BP if over 140/90 (goal /80)  - Diabetes management as per primary team  - PT/OT as needed  - DVT ppx: SCDs, Enoxaparin    - Stroke Labs:  HbA1C: 6.0  LDL: 60 ( On HIS as above).       Discussed with Neuro attending Dr. King during rounds.

## 2025-01-13 NOTE — PROGRESS NOTE ADULT - SUBJECTIVE AND OBJECTIVE BOX
PGY-1 Progress Note discussed with attending    Dyllan Piper via Teams TILL 5:00 PM  PLEASE CONTACT ON CALL TEAM:  - On Call Team (Please refer to Linda) FROM 5:00 PM - 8:30PM  - Nightfloat Team FROM 8:30 -7:30 AM    CHIEF COMPLAINT & BRIEF HOSPITAL COURSE:    INTERVAL HPI/OVERNIGHT EVENTS:       REVIEW OF SYSTEMS:  CONSTITUTIONAL: No fever, weight loss, or fatigue  RESPIRATORY: No cough, wheezing, chills or hemoptysis; No shortness of breath  CARDIOVASCULAR: No chest pain, palpitations, dizziness, or leg swelling  GASTROINTESTINAL: No abdominal pain. No nausea, vomiting, or hematemesis; No diarrhea or constipation. No melena or hematochezia.  GENITOURINARY: No dysuria or hematuria, urinary frequency  NEUROLOGICAL: No headaches, memory loss, loss of strength, numbness, or tremors  SKIN: No itching, burning, rashes, or lesions     Vital Signs Last 24 Hrs  T(C): 36.3 (13 Jan 2025 06:45), Max: 36.8 (12 Jan 2025 18:00)  T(F): 97.3 (13 Jan 2025 06:45), Max: 98.2 (12 Jan 2025 18:00)  HR: 55 (13 Jan 2025 06:45) (52 - 112)  BP: 145/63 (13 Jan 2025 06:45) (102/52 - 145/63)  BP(mean): 91 (13 Jan 2025 06:45) (79 - 111)  RR: 18 (13 Jan 2025 05:26) (18 - 18)  SpO2: 95% (13 Jan 2025 05:26) (93% - 96%)    Parameters below as of 13 Jan 2025 05:26  Patient On (Oxygen Delivery Method): room air        PHYSICAL EXAMINATION:  GENERAL: NAD, well built  HEAD:  Atraumatic, Normocephalic  EYES:  conjunctiva and sclera clear  NECK: Supple, No JVD, Normal thyroid  CHEST/LUNG: Clear to auscultation. Clear to percussion bilaterally; No rales, rhonchi, wheezing, or rubs  HEART: Regular rate and rhythm; No murmurs, rubs, or gallops  ABDOMEN: Soft, Nontender, Nondistended; Bowel sounds present  NERVOUS SYSTEM:  Alert & Oriented X3,    EXTREMITIES:  2+ Peripheral Pulses, No clubbing, cyanosis, or edema  SKIN: warm dry                          12.2   4.41  )-----------( 134      ( 13 Jan 2025 06:00 )             35.9     01-13    142  |  114[H]  |  17  ----------------------------<  107[H]  3.9   |  20[L]  |  0.73    Ca    9.0      13 Jan 2025 06:00  Phos  3.0     01-13  Mg     2.4     01-13    TPro  5.9[L]  /  Alb  3.1[L]  /  TBili  0.7  /  DBili  x   /  AST  21  /  ALT  18  /  AlkPhos  76  01-13    LIVER FUNCTIONS - ( 13 Jan 2025 06:00 )  Alb: 3.1 g/dL / Pro: 5.9 g/dL / ALK PHOS: 76 U/L / ALT: 18 U/L DA / AST: 21 U/L / GGT: x                   CAPILLARY BLOOD GLUCOSE      RADIOLOGY & ADDITIONAL TESTS:                   PGY-1 Progress Note discussed with attending    Dyllan Piper via Teams TILL 5:00 PM  PLEASE CONTACT ON CALL TEAM:  - On Call Team (Please refer to Linda) FROM 5:00 PM - 8:30PM  - Nightfloat Team FROM 8:30 -7:30 AM    INTERVAL HPI/OVERNIGHT EVENTS:   No acute overnight events reported. Patient examined at bedside in the AM. Plan discussed with patient and wife at bedside.      REVIEW OF SYSTEMS:  CONSTITUTIONAL: No fever, weight loss, or fatigue  RESPIRATORY: No cough, wheezing, chills or hemoptysis; No shortness of breath  CARDIOVASCULAR: No chest pain, palpitations, dizziness, or leg swelling  GASTROINTESTINAL: No abdominal pain. No nausea, vomiting, or hematemesis; No diarrhea or constipation. No melena or hematochezia.  GENITOURINARY: No dysuria or hematuria, urinary frequency  NEUROLOGICAL: Complete loss of vision left eye, reduced vision right side   SKIN: No itching, burning, rashes, or lesions     Vital Signs Last 24 Hrs  T(C): 36.3 (13 Jan 2025 06:45), Max: 36.8 (12 Jan 2025 18:00)  T(F): 97.3 (13 Jan 2025 06:45), Max: 98.2 (12 Jan 2025 18:00)  HR: 55 (13 Jan 2025 06:45) (52 - 112)  BP: 145/63 (13 Jan 2025 06:45) (102/52 - 145/63)  BP(mean): 91 (13 Jan 2025 06:45) (79 - 111)  RR: 18 (13 Jan 2025 05:26) (18 - 18)  SpO2: 95% (13 Jan 2025 05:26) (93% - 96%)    Parameters below as of 13 Jan 2025 05:26  Patient On (Oxygen Delivery Method): room air        PHYSICAL EXAMINATION:  GENERAL: NAD, well built  HEAD:  Atraumatic, Normocephalic  EYES:  conjunctiva and sclera clear  NECK: Supple, No JVD, Normal thyroid  CHEST/LUNG: Clear to auscultation. Clear to percussion bilaterally; No rales, rhonchi, wheezing, or rubs  HEART: Regular rate and rhythm; No murmurs, rubs, or gallops  ABDOMEN: Soft, Nontender, Nondistended; Bowel sounds present  NERVOUS SYSTEM:  Alert & Oriented X3, hazy vision right side, absent vision left side  EXTREMITIES:  2+ Peripheral Pulses, No clubbing, cyanosis, or edema  SKIN: warm dry                          12.2   4.41  )-----------( 134      ( 13 Jan 2025 06:00 )             35.9     01-13    142  |  114[H]  |  17  ----------------------------<  107[H]  3.9   |  20[L]  |  0.73    Ca    9.0      13 Jan 2025 06:00  Phos  3.0     01-13  Mg     2.4     01-13    TPro  5.9[L]  /  Alb  3.1[L]  /  TBili  0.7  /  DBili  x   /  AST  21  /  ALT  18  /  AlkPhos  76  01-13    LIVER FUNCTIONS - ( 13 Jan 2025 06:00 )  Alb: 3.1 g/dL / Pro: 5.9 g/dL / ALK PHOS: 76 U/L / ALT: 18 U/L DA / AST: 21 U/L / GGT: x                   CAPILLARY BLOOD GLUCOSE      RADIOLOGY & ADDITIONAL TESTS: No new imaging

## 2025-01-13 NOTE — PROGRESS NOTE ADULT - ATTENDING COMMENTS
Patient seen at bedside, states he feels good  On exam, patient is AOx3, NAD, cardiopulmonary exams unremarkable, abdomen soft, NT/ND, extremities without edema.  Labs reviewed, CBC, BMP, LFT without significant change.    Assessment and plan:   84-year-old male, AAOx3, ambulates with a  walker with left eye blindness, HTN, cad s/p CABG , hld, presents reporting episode of right eye vision loss that occurred 3 days ago. Admitted for CVA vs TIA work up.    # R blurry vision, r/o stroke  # L eye blindness  # HTN  # CAD s/p CABG    - TTE without thrombus or shunt  - patient unable to tolerate brain MRI, try xanax PRN next time sent for MRI  - neurology recs appreciated, continue DAPT x 21 days  - continue home meds  - DVT ppx: Lovenox . Patient seen at bedside, states he feels good  On exam, patient is AOx3, NAD, cardiopulmonary exams unremarkable, abdomen soft, NT/ND, extremities without edema.  Labs reviewed, CBC, BMP, LFT without significant change.    Assessment and plan:   84-year-old male, AAOx3, ambulates with a  walker with left eye blindness, HTN, cad s/p CABG , hld, presents reporting episode of right eye vision loss that occurred 3 days ago. Admitted for CVA vs TIA work up.    # R blurry vision, r/o stroke  # L eye blindness  # HTN  # CAD s/p CABG    - TTE without thrombus or shunt  - MRI brain and orbit without signs of stroke or neuritis  - neurology recs appreciated, continue DAPT x 21 days  - continue home meds  - DVT ppx: Lovenox .

## 2025-01-13 NOTE — PROGRESS NOTE ADULT - ASSESSMENT
84-year-old male, AAOx3, ambulates with a  walker with left eye blindness, Rt eye glaucoma, HTN, cad s/p CABG , hld, presents reporting episode of right eye vision loss that occurred 3 days ago. Patient is being admitted for CVA vs TIA work up. Cardiology was consulted

## 2025-01-13 NOTE — PROGRESS NOTE ADULT - SUBJECTIVE AND OBJECTIVE BOX
Neurology Stroke Progress Note    INTERVAL HPI/OVERNIGHT EVENTS:  Patient seen and examined @ bedside. Pt [place]yanese speaking, but able to understand and have a conversation in English,  services offered, but pt fine to continue in English. Pt upset and frustrated about his R eye, his eye drops and MRI.     MEDICATIONS  (STANDING):  amLODIPine   Tablet 10 milliGRAM(s) Oral daily  aspirin enteric coated 81 milliGRAM(s) Oral daily  atorvastatin 80 milliGRAM(s) Oral at bedtime  clopidogrel Tablet 75 milliGRAM(s) Oral daily  dorzolamide 2% Ophthalmic Solution 1 Drop(s) Both EYES three times a day  enoxaparin Injectable 40 milliGRAM(s) SubCutaneous every 24 hours  isosorbide   dinitrate Tablet (ISORDIL) 20 milliGRAM(s) Oral three times a day  latanoprost 0.005% Ophthalmic Solution 1 Drop(s) Both EYES at bedtime  lisinopril 40 milliGRAM(s) Oral daily  metoprolol tartrate 25 milliGRAM(s) Oral daily  timolol 0.5% Solution 1 Drop(s) Both EYES daily    MEDICATIONS  (PRN):  acetaminophen     Tablet .. 650 milliGRAM(s) Oral every 6 hours PRN Temp greater or equal to 38C (100.4F), Mild Pain (1 - 3)  ALPRAZolam 0.25 milliGRAM(s) Oral once PRN before MRI  ALPRAZolam 0.25 milliGRAM(s) Oral daily PRN Anxiety  ALPRAZolam 0.25 milliGRAM(s) Oral at bedtime PRN Anxiety  melatonin 3 milliGRAM(s) Oral at bedtime PRN Insomnia      Allergies  No Known Allergies      Vital Signs Last 24 Hrs  T(C): 36.4 (13 Jan 2025 10:34), Max: 36.8 (12 Jan 2025 18:00)  T(F): 97.5 (13 Jan 2025 10:34), Max: 98.2 (12 Jan 2025 18:00)  HR: 50 (13 Jan 2025 10:34) (50 - 112)  BP: 96/65 (13 Jan 2025 10:34) (96/65 - 145/63)  BP(mean): 91 (13 Jan 2025 06:45) (79 - 111)  RR: 18 (13 Jan 2025 10:34) (18 - 18)  SpO2: 98% (13 Jan 2025 10:34) (93% - 98%)    Parameters below as of 13 Jan 2025 10:34  Patient On (Oxygen Delivery Method): room air        Physical exam:  General: No acute distress, awake and alert  Eyes: Anicteric sclerae, moist conjunctivae, see below for CNs  Neck: trachea midline, FROM, supple.  Cardiovascular: Regular rate and rhythm.   Pulmonary: Respirations appear to be even and non labored.     Neurologic:  -Mental status: Awake, alert, oriented to person, place, and time. Speech is fluent with intact naming, repetition, and comprehension, no dysarthria. Recent and remote memory intact. Follows commands. Attention/concentration intact. Fund of knowledge appropriate.    -Cranial nerves:   II: L eye Blindness w/Ptosis(since 2yrs of age per pt). R eye w/ some baseline visual deficits 2/2 glaucoma per pt, Intact central vision, but has  issues with peripheral vision.   III, IV, VI: Extraocular movements are intact without nystagmus in R eye. KATE L Pupil, R Pupil 6, no Rxn to light noted, irregularly/Key hole shaped- Surgical pupil, pt had cataract Sx done and also has Glaucoma.   V:  Facial sensation V1-V3 equal and intact   VII: Face is symmetric with normal eye closure and smile  VIII: Hearing is bilaterally intact to finger rub  IX, X: Uvula is midline and soft palate rises symmetrically  XI: Head turning and shoulder shrug are intact.  XII: Tongue protrudes midline  Motor: Normal bulk and tone. No pronator drift. Strength bilateral upper extremity 5/5, bilateral lower extremities 5/5.  Sensation: Intact to light touch bilaterally. No neglect or extinction on double simultaneous testing.  Coordination: No dysmetria on finger-to-nose.       NIHSS: 2(for baseline visual deficits in R eye.  mRS: 2    LABS:                        12.2   4.41  )-----------( 134      ( 13 Jan 2025 06:00 )             35.9     01-13    142  |  114[H]  |  17  ----------------------------<  107[H]  3.9   |  20[L]  |  0.73    Ca    9.0      13 Jan 2025 06:00  Phos  3.0     01-13  Mg     2.4     01-13    TPro  5.9[L]  /  Alb  3.1[L]  /  TBili  0.7  /  DBili  x   /  AST  21  /  ALT  18  /  AlkPhos  76  01-13      Urinalysis Basic - ( 13 Jan 2025 06:00 )    Color: x / Appearance: x / SG: x / pH: x  Gluc: 107 mg/dL / Ketone: x  / Bili: x / Urobili: x   Blood: x / Protein: x / Nitrite: x   Leuk Esterase: x / RBC: x / WBC x   Sq Epi: x / Non Sq Epi: x / Bacteria: x        RADIOLOGY & ADDITIONAL TESTS:    CT Head No Cont (01.09.25 @ 18:35)   IMPRESSION:    CT HEAD:  No acute intracranial hemorrhage, mass effect, or midline shift.    CTA NECK:  No evidence of significant stenosis or occlusion.    CTA HEAD:  No large vessel occlusion, significant stenosis or vascular abnormality   identified.      TTE W or WO Ultrasound Enhancing Agent (01.10.25 @ 14:07)   CONCLUSIONS:      1. Left ventricular cavity is normal in size. Left ventricular wall thickness is mildly increased. Septal motion is abnormal consistent with previous cardiac surgery. Left ventricularsystolic function is normal with an ejection fraction visually estimated at 55 to 60 %.   2. The left ventricular diastolic function is indeterminate.   3. The right ventricle is not well visualized.   4. Grossly normal RV size and systolic function.   5. 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   6. Trileaflet aortic valve. Fibrocalcific aortic valve sclerosis without stenosis.   7. Mild to moderate aortic regurgitation.   8. Structurally normal mitral valve with normal leaflet excursion.   9. Trace mitral regurgitation.  10. Mild pulmonic regurgitation.  11. Agitated saline injection was negative for intracardiac shunt.  12. No echocardiographic evidence of pulmonary hypertension.  13. No pericardial effusion seen.  14. No prior echocardiogram is available for comparison.           Neurology Stroke Progress Note    INTERVAL HPI/OVERNIGHT EVENTS:  Patient seen and examined @ bedside. Pt [place] speaking, but able to understand and have a conversation in English,  services offered, but pt fine to continue in English. Pt upset and frustrated about his R eye, his eye drops and MRI.     MEDICATIONS  (STANDING):  amLODIPine   Tablet 10 milliGRAM(s) Oral daily  aspirin enteric coated 81 milliGRAM(s) Oral daily  atorvastatin 80 milliGRAM(s) Oral at bedtime  clopidogrel Tablet 75 milliGRAM(s) Oral daily  dorzolamide 2% Ophthalmic Solution 1 Drop(s) Both EYES three times a day  enoxaparin Injectable 40 milliGRAM(s) SubCutaneous every 24 hours  isosorbide   dinitrate Tablet (ISORDIL) 20 milliGRAM(s) Oral three times a day  latanoprost 0.005% Ophthalmic Solution 1 Drop(s) Both EYES at bedtime  lisinopril 40 milliGRAM(s) Oral daily  metoprolol tartrate 25 milliGRAM(s) Oral daily  timolol 0.5% Solution 1 Drop(s) Both EYES daily    MEDICATIONS  (PRN):  acetaminophen     Tablet .. 650 milliGRAM(s) Oral every 6 hours PRN Temp greater or equal to 38C (100.4F), Mild Pain (1 - 3)  ALPRAZolam 0.25 milliGRAM(s) Oral once PRN before MRI  ALPRAZolam 0.25 milliGRAM(s) Oral daily PRN Anxiety  ALPRAZolam 0.25 milliGRAM(s) Oral at bedtime PRN Anxiety  melatonin 3 milliGRAM(s) Oral at bedtime PRN Insomnia      Allergies  No Known Allergies          Vital Signs Last 24 Hrs  T(C): 36.4 (13 Jan 2025 10:34), Max: 36.8 (12 Jan 2025 18:00)  T(F): 97.5 (13 Jan 2025 10:34), Max: 98.2 (12 Jan 2025 18:00)  HR: 50 (13 Jan 2025 10:34) (50 - 112)  BP: 96/65 (13 Jan 2025 10:34) (96/65 - 145/63)  BP(mean): 91 (13 Jan 2025 06:45) (79 - 111)  RR: 18 (13 Jan 2025 10:34) (18 - 18)  SpO2: 98% (13 Jan 2025 10:34) (93% - 98%)  Parameters below as of 13 Jan 2025 10:34  Patient On (Oxygen Delivery Method): room air      Physical exam:  General: No acute distress, awake and alert  Eyes: Anicteric sclerae, moist conjunctivae, see below for CNs  Neck: trachea midline, FROM, supple.  Cardiovascular: Regular rate and rhythm.   Pulmonary: Respirations appear to be even and non labored.     Neurologic:  -Mental status: Awake, alert, oriented to person, place, and time. Speech is fluent with intact naming, repetition, and comprehension, no dysarthria. Recent and remote memory intact. Follows commands. Attention/concentration intact. Fund of knowledge appropriate.    -Cranial nerves:   II: L eye Blindness w/Ptosis(since 2yrs of age per pt). R eye w/ some baseline visual deficits 2/2 glaucoma per pt, Intact central vision, but has  issues with peripheral vision.   III, IV, VI: Extraocular movements are intact without nystagmus in R eye. KATE L Pupil, R Pupil 6, no Rxn to light noted, irregularly/Key hole shaped- Surgical pupil, pt had cataract Sx done and also has Glaucoma.   V:  Facial sensation V1-V3 equal and intact   VII: Face is symmetric with normal eye closure and smile  VIII: Hearing is bilaterally intact to finger rub  IX, X: Uvula is midline and soft palate rises symmetrically  XI: Head turning and shoulder shrug are intact.  XII: Tongue protrudes midline  Motor: Normal bulk and tone. No pronator drift. Strength bilateral upper extremity 5/5, bilateral lower extremities 5/5.  Sensation: Intact to light touch bilaterally. No neglect or extinction on double simultaneous testing.  Coordination: No dysmetria on finger-to-nose.       NIHSS: 2 (for baseline visual deficits in R eye)  mRS: 2          LABS:                        12.2   4.41  )-----------( 134      ( 13 Jan 2025 06:00 )             35.9     01-13    142  |  114[H]  |  17  ----------------------------<  107[H]  3.9   |  20[L]  |  0.73    Ca    9.0      13 Jan 2025 06:00  Phos  3.0     01-13  Mg     2.4     01-13    TPro  5.9[L]  /  Alb  3.1[L]  /  TBili  0.7  /  DBili  x   /  AST  21  /  ALT  18  /  AlkPhos  76  01-13      Urinalysis Basic - ( 13 Jan 2025 06:00 )    Color: x / Appearance: x / SG: x / pH: x  Gluc: 107 mg/dL / Ketone: x  / Bili: x / Urobili: x   Blood: x / Protein: x / Nitrite: x   Leuk Esterase: x / RBC: x / WBC x   Sq Epi: x / Non Sq Epi: x / Bacteria: x          RADIOLOGY & ADDITIONAL TESTS:    CT Head No Cont (01.09.25 @ 18:35)   IMPRESSION:    CT HEAD:  No acute intracranial hemorrhage, mass effect, or midline shift.    CTA NECK:  No evidence of significant stenosis or occlusion.    CTA HEAD:  No large vessel occlusion, significant stenosis or vascular abnormality   identified.      TTE W or WO Ultrasound Enhancing Agent (01.10.25 @ 14:07)   CONCLUSIONS:      1. Left ventricular cavity is normal in size. Left ventricular wall thickness is mildly increased. Septal motion is abnormal consistent with previous cardiac surgery. Left ventricular systolic function is normal with an ejection fraction visually estimated at 55 to 60 %.   2. The left ventricular diastolic function is indeterminate.   3. The right ventricle is not well visualized.   4. Grossly normal RV size and systolic function.   5. 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   6. Trileaflet aortic valve. Fibrocalcific aortic valve sclerosis without stenosis.   7. Mild to moderate aortic regurgitation.   8. Structurally normal mitral valve with normal leaflet excursion.   9. Trace mitral regurgitation.  10. Mild pulmonic regurgitation.  11. Agitated saline injection was negative for intracardiac shunt.  12. No echocardiographic evidence of pulmonary hypertension.  13. No pericardial effusion seen.  14. No prior echocardiogram is available for comparison.

## 2025-01-13 NOTE — PROGRESS NOTE ADULT - SUBJECTIVE AND OBJECTIVE BOX
PRESENTING CC: Rt. eye vision loss    OVERNIGHT EVENTS: No new events overnight      PMH:   PAST MEDICAL & SURGICAL HISTORY:  HTN (hypertension)    CAD (coronary artery disease)    HLD (hyperlipidemia)    Stented coronary artery    History of cholecystectomy        Allergies    No Known Allergies    Intolerances        MEDICATIONS  (STANDING):  amLODIPine   Tablet 10 milliGRAM(s) Oral daily  aspirin enteric coated 81 milliGRAM(s) Oral daily  atorvastatin 80 milliGRAM(s) Oral at bedtime  clopidogrel Tablet 75 milliGRAM(s) Oral daily  dorzolamide 2% Ophthalmic Solution 1 Drop(s) Both EYES three times a day  enoxaparin Injectable 40 milliGRAM(s) SubCutaneous every 24 hours  isosorbide   dinitrate Tablet (ISORDIL) 20 milliGRAM(s) Oral three times a day  latanoprost 0.005% Ophthalmic Solution 1 Drop(s) Both EYES at bedtime  lisinopril 40 milliGRAM(s) Oral daily  metoprolol tartrate 25 milliGRAM(s) Oral daily  timolol 0.5% Solution 1 Drop(s) Both EYES daily    MEDICATIONS  (PRN):  acetaminophen     Tablet .. 650 milliGRAM(s) Oral every 6 hours PRN Temp greater or equal to 38C (100.4F), Mild Pain (1 - 3)  melatonin 3 milliGRAM(s) Oral at bedtime PRN Insomnia      FAMILY HISTORY:    Reviewed; no change from my prior note    SOCIAL HISTORY:  Reviewed, no change from my prior note    REVIEW OF SYSTEMS:  Constitutional: [ ] fever, [ ]weight loss,  [ ]fatigue  Eyes: [ ] visual changes  Respiratory: [ ]shortness of breath;  [ ] cough, [ ]wheezing, [ ]chills, [ ]hemoptysis  Cardiovascular: [ ] chest pain, [ ]palpitations, [ ]dizziness,  [ ]leg swelling [ ]syncope  Gastrointestinal: [ ] abdominal pain, [ ]nausea, [ ]vomiting,  [ ]diarrhea   Genitourinary: [ ] dysuria, [ ] hematuria  Neurologic: [ ] headaches [ ] tremors [ ] weakness [ ] lightheadedness  Skin: [ ] itching, [ ]burning, [ ] rashes  Endocrine: [ ] heat or cold intolerance  Musculoskeletal: [ ] joint pain or swelling; [ ] muscle, back, or extremity pain  Psychiatric: [ ] depression, [ ]anxiety, [ ]mood swings, or [ ]difficulty sleeping  Hematologic: [ ] easy bruising, [ ] bleeding gums    [x] All remaining systems negative except as per above.   [  ] Unable to obtain    Vital Signs Last 24 Hrs  T(C): 36.3 (13 Jan 2025 14:00), Max: 36.8 (12 Jan 2025 18:00)  T(F): 97.3 (13 Jan 2025 14:00), Max: 98.2 (12 Jan 2025 18:00)  HR: 54 (13 Jan 2025 14:00) (50 - 112)  BP: 115/59 (13 Jan 2025 14:00) (96/65 - 145/63)  BP(mean): 79 (13 Jan 2025 12:15) (79 - 111)  RR: 18 (13 Jan 2025 14:00) (18 - 18)  SpO2: 97% (13 Jan 2025 14:00) (93% - 98%)    Parameters below as of 13 Jan 2025 14:00  Patient On (Oxygen Delivery Method): room air      I&O's Summary      PHYSICAL EXAM:  General: No acute distress  HEENT: EOMI  Neck: No JVD  Lungs: Clear to auscultation bilaterally; No rales or wheezing  Heart: Regular rate and rhythm; No murmurs, rubs, or gallops  Abdomen: Soft, non tender, non distended   Extremities: Warm, no edema   Nervous system:  Alert & Oriented X2  Psychiatric: Normal affect  Skin: No rashes or lesions    LABS:  01-13    142  |  114[H]  |  17  ----------------------------<  107[H]  3.9   |  20[L]  |  0.73    Ca    9.0      13 Jan 2025 06:00  Phos  3.0     01-13  Mg     2.4     01-13    TPro  5.9[L]  /  Alb  3.1[L]  /  TBili  0.7  /  DBili  x   /  AST  21  /  ALT  18  /  AlkPhos  76  01-13    Creatinine Trend: 0.73<--, 0.80<--, 0.93<--, 0.87<--                        12.2   4.41  )-----------( 134      ( 13 Jan 2025 06:00 )             35.9       Lipid Panel:   Cardiac Enzymes:         RADIOLOGY: < from: CT Angio Neck w/ IV Cont (01.09.25 @ 18:35) >  IMPRESSION:    CT HEAD:  No acute intracranial hemorrhage, mass effect, or midline shift.    CTA NECK:  No evidence of significant stenosis or occlusion.    CTA HEAD:  No large vessel occlusion, significant stenosis or vascular abnormality   identified.        --- End of Report ---    < end of copied text >  < from: CT Angio Head w/ IV Cont (01.09.25 @ 18:34) >    IMPRESSION:    CT HEAD:  No acute intracranial hemorrhage, mass effect, or midline shift.    CTA NECK:  No evidence of significant stenosis or occlusion.    CTA HEAD:  No large vessel occlusion, significant stenosis or vascular abnormality   identified.        --- End of Report ---      < end of copied text >      ECG: < from: 12 Lead ECG (01.09.25 @ 16:40) >    Diagnosis Line Normal sinus rhythm 63  Left anterior fascicular block  Nonspecific ST abnormality  Abnormal ECG    Confirmed by MARIETTA MONROE ([id number]) on 1/11/2025 10:45:03 PM    < end of copied text >      TELEMETRY: NSR, sinus lisbeth HR range 39-75    ECHO:   < from: TTE W or WO Ultrasound Enhancing Agent (01.10.25 @ 14:07) >     CONCLUSIONS:      1. Left ventricular cavity is normal in size. Left ventricular wall thickness is mildly increased. Septal motion is abnormal consistent with previous cardiac surgery. Left ventricularsystolic function is normal with an ejection fraction visually estimated at 55 to 60 %.   2. The left ventricular diastolic function is indeterminate.   3. The right ventricle is not well visualized.   4. Grossly normal RV size and systolic function.   5. 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   6. Trileaflet aortic valve. Fibrocalcific aortic valve sclerosis without stenosis.   7. Mild to moderate aortic regurgitation.   8. Structurally normal mitral valve with normal leaflet excursion.   9. Trace mitral regurgitation.  10. Mild pulmonic regurgitation.  11. Agitated saline injection was negative for intracardiac shunt.  12. No echocardiographic evidence of pulmonary hypertension.  13. No pericardial effusion seen.  14. No prior echocardiogram is available for comparison.    ________________________________________________________________________________________  FINDINGS:     Left Ventricle:  The left ventricular cavity is normal in size. Left ventricular wall thickness is mildly increased. Septal motion is abnormal consistent with previous cardiac surgery. Left ventricular systolic function is normal with an ejection fraction visually estimated at 55 to 60%. The left ventricular diastolic function is indeterminate.     Right Ventricle:  The right ventricle is not well visualized. Right ventricular systolic function is normal. Grossly normal RV size and systolic function.     Left Atrium:  The left atrium is normal in size with an indexed volume of 31.29 ml/m².     Right Atrium:  The right atrium is normal in size.     Interatrial Septum:  Interatrial septum is aneurysmal. Agitated saline injection was negative for intracardiac shunt.     Aortic Valve:  The aortic valve appears trileaflet. There is fibrocalcific aortic valve sclerosis without stenosis. There is mild to moderate aortic regurgitation.     Mitral Valve:  Structurally normal mitral valve with normal leaflet excursion. There is trace mitral regurgitation.     Tricuspid Valve:  Structurally normal tricuspid valve with normal leaflet excursion. There is trace tricuspid regurgitation. There is no echocardiographic evidence of pulmonary hypertension. Estimated pulmonary artery systolic pressure is 31 mmHg.     Pulmonic Valve:  Structurally normal pulmonic valve with normal leaflet excursion. There is no pulmonic valve stenosis. There is mild pulmonic regurgitation.     Aorta:  The proximal aorta is not well visualized. The aortic root at the sinuses of Valsalva is dilated, measuring 3.90 cm (indexed 2.20 cm/m²).     Pericardium:  No pericardial effusion seen.     Systemic Veins:  The inferior vena cava is normal in size measuring 0.70 cm in diameter, (normal <2.1cm) with normal inspiratory collapse (normal >50%) consistent with normal right atrial pressure (~3, range 0-5mmHg).  ____________________________________________________________________    < end of copied text >

## 2025-01-13 NOTE — PROGRESS NOTE ADULT - PROBLEM SELECTOR PLAN 1
P/w with right eye vision loss  Vitals: temp 98, HR 64, /73  cth/angio/neck: neg   Code stroke NIHSS    0 called in ED  admitted fot cva work up  EKG showing NSR   lipid panel wnl  TTE; EF 55 - 60%    xanax 0.25 prn at bedtime, and daytime    -c/w ASA 81mg QD, atorvastatin 80 mg QD  -c/w plavix - DAPT for total 21 days  -f/u a1c  -pending mri brain w/wo contrast. mri orbits w/wo contrast eval for retinal artery occlusion, trasenit left vision loss   (pt unable to tolerate mri 1/11 due to claustrophobia, plan to do MRI again with xanax 0.25)  - Neurology Dr. King/Sofía following P/w with right eye vision loss  Vitals: temp 98, HR 64, /73  cth/angio/neck: neg   Code stroke NIHSS    0 called in ED  admitted for cva work up  EKG showing NSR   lipid panel wnl  TTE; EF 55 - 60%    xanax 0.25 prn at bedtime, and daytime    -c/w ASA 81mg QD, atorvastatin 80 mg QD  -c/w plavix - DAPT for total 21 days  - MR performed today, negative

## 2025-01-13 NOTE — CHART NOTE - NSCHARTNOTEFT_GEN_A_CORE
Informed by RN that patient appears disoriented after receiving Ativan required to tolerate MR scan. Patient evaluated at bedside, wife present at bedside. Vital signs stable (HR 60, /60mmHg, RR 18 SpO2 98%), patient in no acute distress, appears confused and mildly agitated. Patient is alert and oriented x3 (name, January 2025, hospital in [place]), follows all commands, no focal neurological deficit appreciated. Remainder of exam wnl. Plan discussed with wife. Will continue to monitor for now, will sign out to night team. PGY-3 Dr. Jefferson made aware.

## 2025-01-13 NOTE — PROGRESS NOTE ADULT - PROBLEM SELECTOR PLAN 6
CT head, CT neck, CTA head: neg  see above

## 2025-01-14 ENCOUNTER — TRANSCRIPTION ENCOUNTER (OUTPATIENT)
Age: 85
End: 2025-01-14

## 2025-01-14 VITALS
DIASTOLIC BLOOD PRESSURE: 64 MMHG | TEMPERATURE: 98 F | HEART RATE: 52 BPM | RESPIRATION RATE: 18 BRPM | SYSTOLIC BLOOD PRESSURE: 101 MMHG | OXYGEN SATURATION: 97 %

## 2025-01-14 LAB
ANION GAP SERPL CALC-SCNC: 7 MMOL/L — SIGNIFICANT CHANGE UP (ref 5–17)
BUN SERPL-MCNC: 16 MG/DL — SIGNIFICANT CHANGE UP (ref 7–18)
CALCIUM SERPL-MCNC: 9.4 MG/DL — SIGNIFICANT CHANGE UP (ref 8.4–10.5)
CHLORIDE SERPL-SCNC: 111 MMOL/L — HIGH (ref 96–108)
CO2 SERPL-SCNC: 22 MMOL/L — SIGNIFICANT CHANGE UP (ref 22–31)
CREAT SERPL-MCNC: 0.85 MG/DL — SIGNIFICANT CHANGE UP (ref 0.5–1.3)
EGFR: 85 ML/MIN/1.73M2 — SIGNIFICANT CHANGE UP
GLUCOSE SERPL-MCNC: 120 MG/DL — HIGH (ref 70–99)
HCT VFR BLD CALC: 37.1 % — LOW (ref 39–50)
HGB BLD-MCNC: 12.8 G/DL — LOW (ref 13–17)
MAGNESIUM SERPL-MCNC: 2.3 MG/DL — SIGNIFICANT CHANGE UP (ref 1.6–2.6)
MCHC RBC-ENTMCNC: 31.4 PG — SIGNIFICANT CHANGE UP (ref 27–34)
MCHC RBC-ENTMCNC: 34.5 G/DL — SIGNIFICANT CHANGE UP (ref 32–36)
MCV RBC AUTO: 91.2 FL — SIGNIFICANT CHANGE UP (ref 80–100)
NRBC # BLD: 0 /100 WBCS — SIGNIFICANT CHANGE UP (ref 0–0)
PHOSPHATE SERPL-MCNC: 3.5 MG/DL — SIGNIFICANT CHANGE UP (ref 2.5–4.5)
PLATELET # BLD AUTO: 150 K/UL — SIGNIFICANT CHANGE UP (ref 150–400)
POTASSIUM SERPL-MCNC: 4 MMOL/L — SIGNIFICANT CHANGE UP (ref 3.5–5.3)
POTASSIUM SERPL-SCNC: 4 MMOL/L — SIGNIFICANT CHANGE UP (ref 3.5–5.3)
RBC # BLD: 4.07 M/UL — LOW (ref 4.2–5.8)
RBC # FLD: 12.9 % — SIGNIFICANT CHANGE UP (ref 10.3–14.5)
SODIUM SERPL-SCNC: 140 MMOL/L — SIGNIFICANT CHANGE UP (ref 135–145)
WBC # BLD: 4.41 K/UL — SIGNIFICANT CHANGE UP (ref 3.8–10.5)
WBC # FLD AUTO: 4.41 K/UL — SIGNIFICANT CHANGE UP (ref 3.8–10.5)

## 2025-01-14 PROCEDURE — 80048 BASIC METABOLIC PNL TOTAL CA: CPT

## 2025-01-14 PROCEDURE — 85025 COMPLETE CBC W/AUTO DIFF WBC: CPT

## 2025-01-14 PROCEDURE — C8929: CPT

## 2025-01-14 PROCEDURE — 70498 CT ANGIOGRAPHY NECK: CPT | Mod: MC

## 2025-01-14 PROCEDURE — 84100 ASSAY OF PHOSPHORUS: CPT

## 2025-01-14 PROCEDURE — 36415 COLL VENOUS BLD VENIPUNCTURE: CPT

## 2025-01-14 PROCEDURE — A9585: CPT

## 2025-01-14 PROCEDURE — 83036 HEMOGLOBIN GLYCOSYLATED A1C: CPT

## 2025-01-14 PROCEDURE — 80061 LIPID PANEL: CPT

## 2025-01-14 PROCEDURE — 97116 GAIT TRAINING THERAPY: CPT

## 2025-01-14 PROCEDURE — 99232 SBSQ HOSP IP/OBS MODERATE 35: CPT

## 2025-01-14 PROCEDURE — 99285 EMERGENCY DEPT VISIT HI MDM: CPT

## 2025-01-14 PROCEDURE — 93005 ELECTROCARDIOGRAM TRACING: CPT

## 2025-01-14 PROCEDURE — 82803 BLOOD GASES ANY COMBINATION: CPT

## 2025-01-14 PROCEDURE — 80307 DRUG TEST PRSMV CHEM ANLYZR: CPT

## 2025-01-14 PROCEDURE — 85610 PROTHROMBIN TIME: CPT

## 2025-01-14 PROCEDURE — 97162 PT EVAL MOD COMPLEX 30 MIN: CPT

## 2025-01-14 PROCEDURE — 99239 HOSP IP/OBS DSCHRG MGMT >30: CPT | Mod: GC

## 2025-01-14 PROCEDURE — 70543 MRI ORBT/FAC/NCK W/O &W/DYE: CPT | Mod: MC

## 2025-01-14 PROCEDURE — 80053 COMPREHEN METABOLIC PANEL: CPT

## 2025-01-14 PROCEDURE — 84484 ASSAY OF TROPONIN QUANT: CPT

## 2025-01-14 PROCEDURE — 85027 COMPLETE CBC AUTOMATED: CPT

## 2025-01-14 PROCEDURE — 85730 THROMBOPLASTIN TIME PARTIAL: CPT

## 2025-01-14 PROCEDURE — 70553 MRI BRAIN STEM W/O & W/DYE: CPT | Mod: MC

## 2025-01-14 PROCEDURE — 70450 CT HEAD/BRAIN W/O DYE: CPT | Mod: MC

## 2025-01-14 PROCEDURE — 83735 ASSAY OF MAGNESIUM: CPT

## 2025-01-14 PROCEDURE — 70496 CT ANGIOGRAPHY HEAD: CPT | Mod: MC

## 2025-01-14 RX ORDER — CLOPIDOGREL BISULFATE 75 MG/1
1 TABLET, FILM COATED ORAL
Qty: 21 | Refills: 0
Start: 2025-01-14 | End: 2025-02-03

## 2025-01-14 RX ADMIN — Medication 25 MILLIGRAM(S): at 07:16

## 2025-01-14 RX ADMIN — Medication 81 MILLIGRAM(S): at 12:40

## 2025-01-14 RX ADMIN — ENOXAPARIN SODIUM 40 MILLIGRAM(S): 60 INJECTION INTRAVENOUS; SUBCUTANEOUS at 07:15

## 2025-01-14 RX ADMIN — Medication 20 MILLIGRAM(S): at 07:15

## 2025-01-14 RX ADMIN — CLOPIDOGREL BISULFATE 75 MILLIGRAM(S): 75 TABLET, FILM COATED ORAL at 12:40

## 2025-01-14 RX ADMIN — Medication 1 DROP(S): at 07:17

## 2025-01-14 RX ADMIN — Medication 20 MILLIGRAM(S): at 12:40

## 2025-01-14 RX ADMIN — Medication 10 MILLIGRAM(S): at 07:16

## 2025-01-14 RX ADMIN — Medication 1 DROP(S): at 12:40

## 2025-01-14 RX ADMIN — LISINOPRIL 40 MILLIGRAM(S): 30 TABLET ORAL at 07:16

## 2025-01-14 NOTE — DISCHARGE NOTE NURSING/CASE MANAGEMENT/SOCIAL WORK - FINANCIAL ASSISTANCE
[facility] provides services at a reduced cost to those who are determined to be eligible through [facility]’s financial assistance program. Information regarding [facility]’s financial assistance program can be found by going to [email or web address] or by calling [phone].

## 2025-01-14 NOTE — DISCHARGE NOTE PROVIDER - NSDCMRMEDTOKEN_GEN_ALL_CORE_FT
amLODIPine 10 mg oral tablet: 1 tab(s) orally once a day  Aspir 81 oral delayed release tablet: 1 tab(s) orally once a day  clopidogrel 75 mg oral tablet: 1 tab(s) orally once a day  dorzolamide 2% ophthalmic solution: 1 drop(s) in each eye once a day  isosorbide dinitrate 20 mg oral tablet: 1 tab(s) orally once a day  latanoprost 0.005% ophthalmic emulsion: 1 drop(s) in each affected eye once a day  metoprolol tartrate 25 mg oral tablet: 1 tab(s) orally once a day  ramipril 10 mg oral capsule: 1 cap(s) orally once a day  rosuvastatin 5 mg oral capsule: 1 cap(s) orally once a day  timolol maleate 0.5% ophthalmic solution: 1 drop(s) in each affected eye once a day  Tylenol with Codeine #3 oral tablet: 1 tab(s) orally 3 times a day MDD:3

## 2025-01-14 NOTE — PROGRESS NOTE ADULT - PROBLEM SELECTOR PROBLEM 6
R/O CVA (cerebrovascular accident)

## 2025-01-14 NOTE — DISCHARGE NOTE PROVIDER - HOSPITAL COURSE
84-year-old male, AAOx3, ambulates with a  walker with left eye blindness, HTN, cad s/p CABG , hld, presents reporting episode of right eye vision loss that occurred 3 days ago.    ED/Floor: VSS. lab workup unremarkable, CT head, CT anio-head, CT neck was done, and result shows no evidence of intracrainel hemorroahge, cervical stenosis, or crainial vessel stenosis. Patient admitted to medicine.     After admission, Neurology was consulted and recommended MRI brain which showed no acute abnormalities. 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 Cardiology was consulted and recommended TTE, which showed left ventricular wall thickness is mildly increased. Septal motion is abnormal consistent with previous cardiac surgery, no intracardiac shunt. Lipid profile was wnl, HbA1c mildly elevated at 6.0%. Neurology recommended DAPT for 21 days, followed by aspirin only. Cardiology recommended possible EP evaluation for ILD placement. Patient was scheduled for follow up with outpatient ophthalmologist Dr. Ochoa on 01/16/2025 at 9:00PM in the [place] location.    Patient hemodynamically stable and ready for discharge. 84-year-old male, AAOx3, ambulates with a  walker with left eye blindness, HTN, cad s/p CABG , hld, presents reporting episode of right eye vision loss that occurred 3 days ago.    ED/Floor: VSS. lab workup unremarkable, CT head, CT anio-head, CT neck was done, and result shows no evidence of intracranial hemorrhage, cervical stenosis, or cranial vessel stenosis. Patient admitted to medicine.     After admission, Neurology was consulted and recommended MRI brain which showed no acute abnormalities. 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 Cardiology was consulted and recommended TTE, which showed left ventricular wall thickness is mildly increased. Septal motion is abnormal consistent with previous cardiac surgery, no intracardiac shunt. Lipid profile was wnl, HbA1c mildly elevated at 6.0%. Neurology recommended DAPT for 21 days, followed by aspirin only. Cardiology recommended possible EP evaluation for ILD placement. Patient was scheduled for follow up with outpatient ophthalmologist Dr. Ochoa on 01/16/2025 at 9:00PM in the [place] location.    Patient hemodynamically stable and ready for discharge. 84-year-old male, AAOx3, ambulates with a  walker with left eye blindness, HTN, cad s/p CABG , hld, presents reporting episode of right eye vision loss that occurred 3 days ago.    ED/Floor: VSS. lab workup unremarkable, CT head, CT angio-head, CT neck was done, and result shows no evidence of intracranial hemorrhage, cervical stenosis, or cranial vessel stenosis. Patient admitted to medicine.     After admission, Neurology was consulted and recommended MRI brain which showed no acute abnormalities. 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 Cardiology was consulted and recommended TTE, which showed left ventricular wall thickness is mildly increased. Septal motion is abnormal consistent with previous cardiac surgery, no intracardiac shunt. Lipid profile was wnl, HbA1c mildly elevated at 6.0%. Neurology recommended DAPT for 21 days, followed by aspirin only. Cardiology recommended possible EP evaluation for ILD placement. Patient was scheduled for follow up with outpatient ophthalmologist Dr. Ochoa on 01/16/2025 at 9:00AM in the [place] location.    Patient hemodynamically stable and ready for discharge.

## 2025-01-14 NOTE — PROGRESS NOTE ADULT - PROVIDER SPECIALTY LIST ADULT
Cardiology
Internal Medicine
Internal Medicine
Cardiology
Internal Medicine
Neurology
Neurology
Cardiology
Cardiology
Internal Medicine
Internal Medicine

## 2025-01-14 NOTE — PROGRESS NOTE ADULT - PROBLEM SELECTOR PLAN 1
P/w with right eye vision loss  Vitals: temp 98, HR 64, /73  cth/angio/neck: neg   Code stroke NIHSS    0 called in ED  admitted for cva work up  EKG showing NSR   lipid panel wnl  TTE; EF 55 - 60%    xanax 0.25 prn at bedtime, and daytime    -c/w ASA 81mg QD, atorvastatin 80 mg QD  -c/w plavix - DAPT for total 21 days  - MR performed today, negative

## 2025-01-14 NOTE — DISCHARGE NOTE PROVIDER - CARE PROVIDER_API CALL
Anup [place]  Internal Medicine  [address] [place] [address]  Phone: ()-  Fax: ()-  Established Patient  Follow Up Time:     COURTNEY MITCHELL  Phone: ()-  Fax: [phone]  Established Patient  Scheduled Appointment: 01/16/2025 09:00 AM   Anup [place]  Internal Medicine  [address] [place] [address]  Phone: ()-  Fax: ()-  Established Patient  Follow Up Time:     COURTNEY MITCHELL  Phone: ()-  Fax: [phone]  Established Patient  Scheduled Appointment: 01/16/2025 09:00 AM    Pradeep King  Neurology  [address]  [place] [address]  Phone: [phone]  Fax: [phone]  Follow Up Time:     Berlin Johnson  Cardiology  [address] [facility] [address] [place] [id number]  Phone: [phone]  Fax: [phone]  Follow Up Time:

## 2025-01-14 NOTE — PROGRESS NOTE ADULT - NUTRITIONAL ASSESSMENT
This patient has been assessed with a concern for Malnutrition and has been determined to have a diagnosis/diagnoses of Severe protein-calorie malnutrition.    This patient is being managed with:   Diet Regular-  DASH/TLC {Sodium & Cholesterol Restricted}  Entered: Jan 9 2025  9:04PM    The following pending diet order is being considered for treatment of Severe protein-calorie malnutrition:  Diet Regular-  Entered: Jan 11 2025  4:17PM  

## 2025-01-14 NOTE — DISCHARGE NOTE NURSING/CASE MANAGEMENT/SOCIAL WORK - NSDCPEFALRISK_GEN_ALL_CORE
For information on Fall & Injury Prevention, visit: [email or web address] OR  [email or web address] OR  https://www.cdc.gov/steadi/patient.html

## 2025-01-14 NOTE — PROGRESS NOTE ADULT - SUBJECTIVE AND OBJECTIVE BOX
PRESENTING CC: Rt. eye vision loss    OVERNIGHT EVENTS: No new events overnight.       PMH:   PAST MEDICAL & SURGICAL HISTORY:  HTN (hypertension)    CAD (coronary artery disease)    HLD (hyperlipidemia)    Stented coronary artery    History of cholecystectomy        Allergies    No Known Allergies    Intolerances        MEDICATIONS  (STANDING):  amLODIPine   Tablet 10 milliGRAM(s) Oral daily  aspirin enteric coated 81 milliGRAM(s) Oral daily  atorvastatin 80 milliGRAM(s) Oral at bedtime  clopidogrel Tablet 75 milliGRAM(s) Oral daily  dorzolamide 2% Ophthalmic Solution 1 Drop(s) Both EYES three times a day  enoxaparin Injectable 40 milliGRAM(s) SubCutaneous every 24 hours  isosorbide   dinitrate Tablet (ISORDIL) 20 milliGRAM(s) Oral three times a day  latanoprost 0.005% Ophthalmic Solution 1 Drop(s) Both EYES at bedtime  lisinopril 40 milliGRAM(s) Oral daily  metoprolol tartrate 25 milliGRAM(s) Oral daily  timolol 0.5% Solution 1 Drop(s) Both EYES daily    MEDICATIONS  (PRN):  acetaminophen     Tablet .. 650 milliGRAM(s) Oral every 6 hours PRN Temp greater or equal to 38C (100.4F), Mild Pain (1 - 3)  melatonin 3 milliGRAM(s) Oral at bedtime PRN Insomnia      FAMILY HISTORY:    Reviewed; no change from my prior note    SOCIAL HISTORY:  Reviewed, no change from my prior note    REVIEW OF SYSTEMS:  Constitutional: [ ] fever, [ ]weight loss,  [ ]fatigue  Eyes: [ ] visual changes  Respiratory: [ ]shortness of breath;  [ ] cough, [ ]wheezing, [ ]chills, [ ]hemoptysis  Cardiovascular: [ ] chest pain, [ ]palpitations, [ ]dizziness,  [ ]leg swelling [ ]syncope  Gastrointestinal: [ ] abdominal pain, [ ]nausea, [ ]vomiting,  [ ]diarrhea   Genitourinary: [ ] dysuria, [ ] hematuria  Neurologic: [ ] headaches [ ] tremors [ ] weakness [ ] lightheadedness  Skin: [ ] itching, [ ]burning, [ ] rashes  Endocrine: [ ] heat or cold intolerance  Musculoskeletal: [ ] joint pain or swelling; [ ] muscle, back, or extremity pain  Psychiatric: [ ] depression, [ ]anxiety, [ ]mood swings, or [ ]difficulty sleeping  Hematologic: [ ] easy bruising, [ ] bleeding gums    [x] All remaining systems negative except as per above.   [  ] Unable to obtain    Vital Signs Last 24 Hrs  T(C): 36.3 (14 Jan 2025 10:14), Max: 36.4 (13 Jan 2025 20:30)  T(F): 97.4 (14 Jan 2025 10:14), Max: 97.5 (13 Jan 2025 20:30)  HR: 50 (14 Jan 2025 11:38) (49 - 68)  BP: 119/59 (14 Jan 2025 11:38) (101/59 - 168/72)  BP(mean): 76 (14 Jan 2025 11:38) (76 - 77)  RR: 18 (14 Jan 2025 10:14) (16 - 18)  SpO2: 97% (14 Jan 2025 11:38) (95% - 97%)    Parameters below as of 14 Jan 2025 10:14  Patient On (Oxygen Delivery Method): room air      I&O's Summary      PHYSICAL EXAM:  General: No acute distress  Neck: No JVD  Lungs: Clear to auscultation bilaterally; No rales or wheezing  Heart: Regular rate and rhythm; No murmurs, rubs, or gallops  Abdomen: Soft, non tender, non distended   Extremities: Warm, no edema   Nervous system:  Alert & Oriented X3  Psychiatric: Normal affect  Skin: No rashes or lesions    LABS:  01-14    140  |  111[H]  |  16  ----------------------------<  120[H]  4.0   |  22  |  0.85    Ca    9.4      14 Jan 2025 05:48  Phos  3.5     01-14  Mg     2.3     01-14    TPro  5.9[L]  /  Alb  3.1[L]  /  TBili  0.7  /  DBili  x   /  AST  21  /  ALT  18  /  AlkPhos  76  01-13    Creatinine Trend: 0.85<--, 0.73<--, 0.80<--, 0.93<--, 0.87<--                        12.8   4.41  )-----------( 150      ( 14 Jan 2025 05:48 )             37.1       Lipid Panel:   Cardiac Enzymes:           RADIOLOGY:    < from: MR Orbits w/wo IV Cont (01.13.25 @ 15:33) >  IMPRESSION:    MRI BRAIN:  No hydrocephalus, mass effect, acute intracranial hemorrhage, vasogenic   edema, restricted diffusion, or acute territorial infarct.    No abnormal parenchymal, leptomeningeal, or dural enhancement.    MRI ORBITS:  Left phthisis bulbi.  Atrophy of the left optic nerve.  Otherwise unremarkable.  No abnormal enhancement.    ---End of Report ---    < end of copied text >               < from: CT Angio Neck w/ IV Cont (01.09.25 @ 18:35) >  IMPRESSION:    CT HEAD:  No acute intracranial hemorrhage, mass effect, or midline shift.    CTA NECK:  No evidence of significant stenosis or occlusion.    CTA HEAD:  No large vessel occlusion, significant stenosis or vascular abnormality   identified.        --- End of Report ---    < end of copied text >  < from: CT Angio Head w/ IV Cont (01.09.25 @ 18:34) >    IMPRESSION:    CT HEAD:  No acute intracranial hemorrhage, mass effect, or midline shift.    CTA NECK:  No evidence of significant stenosis or occlusion.    CTA HEAD:  No large vessel occlusion, significant stenosis or vascular abnormality   identified.        --- End of Report ---      < end of copied text >      ECG: < from: 12 Lead ECG (01.09.25 @ 16:40) >    Diagnosis Line Normal sinus rhythm 63  Left anterior fascicular block  Nonspecific ST abnormality  Abnormal ECG    Confirmed by MARIETTA MONROE ([id number]) on 1/11/2025 10:45:03 PM    < end of copied text >      TELEMETRY: NSR, sinus lisbeth HR range 39-75    ECHO:   < from: TTE W or WO Ultrasound Enhancing Agent (01.10.25 @ 14:07) >     CONCLUSIONS:      1. Left ventricular cavity is normal in size. Left ventricular wall thickness is mildly increased. Septal motion is abnormal consistent with previous cardiac surgery. Left ventricularsystolic function is normal with an ejection fraction visually estimated at 55 to 60 %.   2. The left ventricular diastolic function is indeterminate.   3. The right ventricle is not well visualized.   4. Grossly normal RV size and systolic function.   5. 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   6. Trileaflet aortic valve. Fibrocalcific aortic valve sclerosis without stenosis.   7. Mild to moderate aortic regurgitation.   8. Structurally normal mitral valve with normal leaflet excursion.   9. Trace mitral regurgitation.  10. Mild pulmonic regurgitation.  11. Agitated saline injection was negative for intracardiac shunt.  12. No echocardiographic evidence of pulmonary hypertension.  13. No pericardial effusion seen.  14. No prior echocardiogram is available for comparison.    ________________________________________________________________________________________  FINDINGS:     Left Ventricle:  The left ventricular cavity is normal in size. Left ventricular wall thickness is mildly increased. Septal motion is abnormal consistent with previous cardiac surgery. Left ventricular systolic function is normal with an ejection fraction visually estimated at 55 to 60%. The left ventricular diastolic function is indeterminate.     Right Ventricle:  The right ventricle is not well visualized. Right ventricular systolic function is normal. Grossly normal RV size and systolic function.     Left Atrium:  The left atrium is normal in size with an indexed volume of 31.29 ml/m².     Right Atrium:  The right atrium is normal in size.     Interatrial Septum:  Interatrial septum is aneurysmal. Agitated saline injection was negative for intracardiac shunt.     Aortic Valve:  The aortic valve appears trileaflet. There is fibrocalcific aortic valve sclerosis without stenosis. There is mild to moderate aortic regurgitation.     Mitral Valve:  Structurally normal mitral valve with normal leaflet excursion. There is trace mitral regurgitation.     Tricuspid Valve:  Structurally normal tricuspid valve with normal leaflet excursion. There is trace tricuspid regurgitation. There is no echocardiographic evidence of pulmonary hypertension. Estimated pulmonary artery systolic pressure is 31 mmHg.     Pulmonic Valve:  Structurally normal pulmonic valve with normal leaflet excursion. There is no pulmonic valve stenosis. There is mild pulmonic regurgitation.     Aorta:  The proximal aorta is not well visualized. The aortic root at the sinuses of Valsalva is dilated, measuring 3.90 cm (indexed 2.20 cm/m²).     Pericardium:  No pericardial effusion seen.     Systemic Veins:  The inferior vena cava is normal in size measuring 0.70 cm in diameter, (normal <2.1cm) with normal inspiratory collapse (normal >50%) consistent with normal right atrial pressure (~3, range 0-5mmHg).  ____________________________________________________________________    < end of copied text >

## 2025-01-14 NOTE — DISCHARGE NOTE NURSING/CASE MANAGEMENT/SOCIAL WORK - NSDCPEPTSTROKESIGNS_GEN_ALL_CORE
Sudden numbness or weakness of the face, arm, or leg, especially on one side of the body. Confusion, trouble speaking or understanding. Trouble seeing in one or both eyes. Trouble walking, dizziness, loss of balance or coordination. Severe headache. DISPLAY PLAN FREE TEXT

## 2025-01-14 NOTE — PROGRESS NOTE ADULT - PROBLEM SELECTOR PLAN 1
-MRI- without evidence of CVA/TIA  - Echo without any cardiac source of emboli. No thrombus noted.  -no ILR placement indicated  -patient needs opthalmology follow up

## 2025-01-14 NOTE — PROGRESS NOTE ADULT - PROBLEM SELECTOR PLAN 2
hx of htn  c/w home meds amlodipine 10mg QD, lisinopril 10mg QD, metoprolol 25mg QD
hx of htn  c/w home meds amlodipine 10mg QD, lisinopril 10mg QD, metoprolol 25mg QD
-stable  - continue amlodipine, metoprolol and lisinopril at current dose.
hx of htn  c/w home meds amlodipine 10mg QD, lisinopril 10mg QD, metoprolol 25mg QD
-stable  - continue amlodipine, metoprolol and lisinopril at current dose.
hx of htn  c/w home meds amlodipine 10mg QD, lisinopril 10mg QD, metoprolol 25mg QD

## 2025-01-14 NOTE — DISCHARGE NOTE NURSING/CASE MANAGEMENT/SOCIAL WORK - PATIENT PORTAL LINK FT
You can access the FollowMyHealth Patient Portal offered by [facility] by registering at the following website: [email or web address]. By joining [facility]’s FollowMyHealth portal, you will also be able to view your health information using other applications (apps) compatible with our system.

## 2025-01-14 NOTE — PROGRESS NOTE ADULT - PROBLEM SELECTOR PLAN 3
Continue atorvastatin.
hx of  hld  c/w aspirin 81mg QD, atorvastatin 80mg QD
Continue atorvastatin
hx of  hld  c/w aspirin 81mg QD, atorvastatin 80mg QD

## 2025-01-14 NOTE — DISCHARGE NOTE PROVIDER - DETAILS OF MALNUTRITION DIAGNOSIS/DIAGNOSES
This patient has been assessed with a concern for Malnutrition and was treated during this hospitalization for the following Nutrition diagnosis/diagnoses:     -  01/11/2025: Severe protein-calorie malnutrition

## 2025-01-14 NOTE — PROGRESS NOTE ADULT - SUBJECTIVE AND OBJECTIVE BOX
PGY-1 Progress Note discussed with attending    Dyllan Piper via Teams TILL 5:00 PM  PLEASE CONTACT ON CALL TEAM:  - On Call Team (Please refer to Linda) FROM 5:00 PM - 8:30PM  - Nightfloat Team FROM 8:30 -7:30 AM    CHIEF COMPLAINT & BRIEF HOSPITAL COURSE:    INTERVAL HPI/OVERNIGHT EVENTS:       REVIEW OF SYSTEMS:  CONSTITUTIONAL: No fever, weight loss, or fatigue  RESPIRATORY: No cough, wheezing, chills or hemoptysis; No shortness of breath  CARDIOVASCULAR: No chest pain, palpitations, dizziness, or leg swelling  GASTROINTESTINAL: No abdominal pain. No nausea, vomiting, or hematemesis; No diarrhea or constipation. No melena or hematochezia.  GENITOURINARY: No dysuria or hematuria, urinary frequency  NEUROLOGICAL: No headaches, memory loss, loss of strength, numbness, or tremors  SKIN: No itching, burning, rashes, or lesions     Vital Signs Last 24 Hrs  T(C): 36.3 (14 Jan 2025 10:14), Max: 36.4 (13 Jan 2025 20:30)  T(F): 97.4 (14 Jan 2025 10:14), Max: 97.5 (13 Jan 2025 20:30)  HR: 49 (14 Jan 2025 10:14) (49 - 68)  BP: 120/63 (14 Jan 2025 10:14) (101/59 - 168/72)  BP(mean): 77 (13 Jan 2025 18:13) (77 - 79)  RR: 18 (14 Jan 2025 10:14) (16 - 18)  SpO2: 95% (14 Jan 2025 10:14) (95% - 97%)    Parameters below as of 14 Jan 2025 10:14  Patient On (Oxygen Delivery Method): room air        PHYSICAL EXAMINATION:  GENERAL: NAD, well built  HEAD:  Atraumatic, Normocephalic  EYES:  conjunctiva and sclera clear  NECK: Supple, No JVD, Normal thyroid  CHEST/LUNG: Clear to auscultation. Clear to percussion bilaterally; No rales, rhonchi, wheezing, or rubs  HEART: Regular rate and rhythm; No murmurs, rubs, or gallops  ABDOMEN: Soft, Nontender, Nondistended; Bowel sounds present  NERVOUS SYSTEM:  Alert & Oriented X3,    EXTREMITIES:  2+ Peripheral Pulses, No clubbing, cyanosis, or edema  SKIN: warm dry                          12.8   4.41  )-----------( 150      ( 14 Jan 2025 05:48 )             37.1     01-14    140  |  111[H]  |  16  ----------------------------<  120[H]  4.0   |  22  |  0.85    Ca    9.4      14 Jan 2025 05:48  Phos  3.5     01-14  Mg     2.3     01-14    TPro  5.9[L]  /  Alb  3.1[L]  /  TBili  0.7  /  DBili  x   /  AST  21  /  ALT  18  /  AlkPhos  76  01-13    LIVER FUNCTIONS - ( 13 Jan 2025 06:00 )  Alb: 3.1 g/dL / Pro: 5.9 g/dL / ALK PHOS: 76 U/L / ALT: 18 U/L DA / AST: 21 U/L / GGT: x                   CAPILLARY BLOOD GLUCOSE      RADIOLOGY & ADDITIONAL TESTS:

## 2025-01-14 NOTE — PROGRESS NOTE ADULT - PROBLEM/PLAN-3
DISPLAY PLAN FREE TEXT
(3) adequate

## 2025-01-14 NOTE — CHART NOTE - NSCHARTNOTEFT_GEN_A_CORE
Called patient's PCP Dr. Hebert and updated about the hospital course, discharge plan and required post discharge follow ups.

## 2025-01-14 NOTE — DISCHARGE NOTE PROVIDER - CARE PROVIDERS DIRECT ADDRESSES
,DirectAddress_Unknown,[email or web address] ,DirectAddress_Unknown,[email or web address]

## 2025-01-14 NOTE — DISCHARGE NOTE PROVIDER - PROVIDER TOKENS
PROVIDER:[TOKEN:[[id number]],ESTABLISHEDPATIENT:[T]],PROVIDER:[TOKEN:[[id number]],SCHEDULEDAPPT:[01/16/2025],SCHEDULEDAPPTTIME:[09:00 AM],ESTABLISHEDPATIENT:[T]] PROVIDER:[TOKEN:[[id number]],ESTABLISHEDPATIENT:[T]],PROVIDER:[TOKEN:[[id number]],SCHEDULEDAPPT:[01/16/2025],SCHEDULEDAPPTTIME:[09:00 AM],ESTABLISHEDPATIENT:[T]],PROVIDER:[TOKEN:[[id number]]],PROVIDER:[TOKEN:[[id number]]]

## 2025-01-14 NOTE — PROGRESS NOTE ADULT - PROBLEM SELECTOR PROBLEM 1
CVA (cerebrovascular accident)
Loss of vision

## 2025-01-14 NOTE — PROGRESS NOTE ADULT - PROBLEM SELECTOR PLAN 4
-s/p CABG- no anginal symptoms.    -continue ASA, atorvastatin
hx of cad s/p cabg   c/w home meds
hx of cad s/p cabg   c/w home meds
-s/p CABG- no anginal symptoms.    -continue ASA, atorvastatin.
hx of cad s/p cabg   c/w home meds
hx of cad s/p cabg   c/w home meds

## 2025-01-14 NOTE — DISCHARGE NOTE PROVIDER - ATTENDING DISCHARGE PHYSICAL EXAMINATION:
Patient was seen and examined at bedside. Denies any new complains. Reports vision is at his baseline now, wife at bedside.     ICU Vital Signs Last 24 Hrs  T(C): 36.4 (14 Jan 2025 14:08), Max: 36.4 (13 Jan 2025 20:30)  T(F): 97.5 (14 Jan 2025 14:08), Max: 97.5 (13 Jan 2025 20:30)  HR: 52 (14 Jan 2025 14:08) (49 - 68)  BP: 101/64 (14 Jan 2025 14:08) (101/59 - 168/72)  BP(mean): 75 (14 Jan 2025 14:08) (75 - 77)  ABP: --  ABP(mean): --  RR: 18 (14 Jan 2025 14:08) (16 - 18)  SpO2: 97% (14 Jan 2025 14:08) (95% - 97%)    O2 Parameters below as of 14 Jan 2025 14:08  Patient On (Oxygen Delivery Method): room air    P/E:  NAD  AAOx3, CTABL. left eye chronic change and atrophy   S1S2 WNL  Abd soft, non tender, BS present  BLLE no edema or calf tenderness     Labs noted     A/P:  Patient lost vision for 30mins before admission but now has been at his baseline. Agree with neurology. Will treat as TIA (plavix for 21days asnd aspirin life long). Discussed and explained to patient and wife at bedside. PT anil GAUTHIER but they have decided to go home. Has an appointment with primary ophthalmology on 01/16/25. Stable to be discharged.

## 2025-01-14 NOTE — DISCHARGE NOTE PROVIDER - NSDCCPCAREPLAN_GEN_ALL_CORE_FT
PRINCIPAL DISCHARGE DIAGNOSIS  Diagnosis: Vision loss  Assessment and Plan of Treatment: You presented to the ED with a chief complaint of vision loss and received a stroke workup. Initial CT of the head and neck was within normal limits. An MRI of your brain and orbita showed no acute changes and no abnormalities in your right eye. A TTE was performed You were evaluated by a Cardiologist (heart specialist) who recommended that you consider being evluated by a heart rhythm specialist. You were evaluated by a Neurologist who recommended that you take PLAVIX (clopidogrel) 75mg for 21 days. Please continue to take your aspirin and Rosuvastatin. You are scheduled for a follow up with your outpatient ophthalmologist Dr. Ochoa on 1/16/25 at 9:00AM [address] [facility] [address], [place] [address].  Please follow up with your PCP within one week of discharge and take your medications as prescribed.      SECONDARY DISCHARGE DIAGNOSES  Diagnosis: HTN (hypertension)  Assessment and Plan of Treatment: You have a history of Hypertension.   On this admission, your Blood Pressure was adequately controlled with your home regimen.  Your blood pressure target is 130/80, maintain healthy lifestyle, low salt diet, avoid fatty food, weight loss, exercise regularly or stay active as tolerated 30 mins X 3 times per week.  Notify your doctor if you have any of the following symptoms:   (Dizziness, Lightheadedness, Blurry vision, Headache, Chest pain, Shortness of breath.)  Please continue taking your home medications and follow-up with your PCP in 1 week from discharge to adjust medications as needed.      Diagnosis: HLD (hyperlipidemia)  Assessment and Plan of Treatment: You have history of Hyperlipidemia. During this admission, your lipid profile was normal.   Please take your home medication Rosuvastatin as prescribed. Maintain healthy lifestyle, low fat diet, exercise regularly and check your lipid levels routinely.   Please follow up with your PCP in 1 week from discharge.      Diagnosis: CAD (coronary artery disease)  Assessment and Plan of Treatment: You have history of coronary artery disease which is a narrowing of the arteries of your heart caused by a buildup of plaque made of cholesterol. The narrowing decreased the amount of blood flow to your heart causing chest pain, shortness of breath, sweating.   You are taking Aspirin as home medication.  Please continue to take your medications as prescribed.  Please follow with your PCP/Cardiologist in a week.      Diagnosis: Prediabetes  Assessment and Plan of Treatment: During this admission, you were found to have an abnormal long term blood glucose value (HbA1c) of 6, which is mildly elevated. Lifestyle modifications (improved diet, increased physical acitvity) were recommended.  Please follow up with your PCP within one week of discharge for further management.     PRINCIPAL DISCHARGE DIAGNOSIS  Diagnosis: Brain TIA  Assessment and Plan of Treatment: You presented to the ED with a chief complaint of vision loss and received a stroke workup. Initial CT of the head and neck was within normal limits. An MRI of your brain and orbita showed no acute changes and no abnormalities in your right eye. A TTE was performed You were evaluated by a Cardiologist (heart specialist) who recommended that you consider being evluated by a heart rhythm specialist. You were evaluated by a Neurologist who recommended that you take PLAVIX (clopidogrel) 75mg for 21 days. Please continue to take your aspirin and Rosuvastatin. You are scheduled for a follow up with your outpatient ophthalmologist Dr. Ochoa on 1/16/25 at 9:00AM [address] [facility] [address], [place] [address].  Please follow up with your PCP within one week of discharge and take your medications as prescribed.      SECONDARY DISCHARGE DIAGNOSES  Diagnosis: HTN (hypertension)  Assessment and Plan of Treatment: You have a history of Hypertension.   On this admission, your Blood Pressure was adequately controlled with your home regimen.  Your blood pressure target is 130/80, maintain healthy lifestyle, low salt diet, avoid fatty food, weight loss, exercise regularly or stay active as tolerated 30 mins X 3 times per week.  Notify your doctor if you have any of the following symptoms:   (Dizziness, Lightheadedness, Blurry vision, Headache, Chest pain, Shortness of breath.)  Please continue taking your home medications and follow-up with your PCP in 1 week from discharge to adjust medications as needed.      Diagnosis: HLD (hyperlipidemia)  Assessment and Plan of Treatment: You have history of Hyperlipidemia. During this admission, your lipid profile was normal.   Please take your home medication Rosuvastatin as prescribed. Maintain healthy lifestyle, low fat diet, exercise regularly and check your lipid levels routinely.   Please follow up with your PCP in 1 week from discharge.      Diagnosis: CAD (coronary artery disease)  Assessment and Plan of Treatment: You have history of coronary artery disease which is a narrowing of the arteries of your heart caused by a buildup of plaque made of cholesterol. The narrowing decreased the amount of blood flow to your heart causing chest pain, shortness of breath, sweating.   You are taking Aspirin as home medication.  Please continue to take your medications as prescribed.  Please follow with your PCP/Cardiologist in a week.      Diagnosis: Prediabetes  Assessment and Plan of Treatment: During this admission, you were found to have an abnormal long term blood glucose value (HbA1c) of 6, which is mildly elevated. Lifestyle modifications (improved diet, increased physical acitvity) were recommended.  Please follow up with your PCP within one week of discharge for further management.

## 2025-03-11 NOTE — ED ADULT NURSE NOTE - CCCP TRG CHIEF CMPLNT
Health Maintenance reviewed, updated and links triggered. Eye, Foot exam and Blood Pressure   Check due (fford) 3/11/25 Voice mail box has not been set up yet, a letter sent for scheduling.     
hypertension
37.2